# Patient Record
Sex: MALE | Race: WHITE | NOT HISPANIC OR LATINO | Employment: UNEMPLOYED | URBAN - METROPOLITAN AREA
[De-identification: names, ages, dates, MRNs, and addresses within clinical notes are randomized per-mention and may not be internally consistent; named-entity substitution may affect disease eponyms.]

---

## 2017-08-21 ENCOUNTER — ALLSCRIPTS OFFICE VISIT (OUTPATIENT)
Dept: OTHER | Facility: OTHER | Age: 52
End: 2017-08-21

## 2017-08-21 DIAGNOSIS — M62.08 SEPARATION OF MUSCLE (NONTRAUMATIC), OTHER SITE: ICD-10-CM

## 2017-08-22 ENCOUNTER — TRANSCRIBE ORDERS (OUTPATIENT)
Dept: ADMINISTRATIVE | Facility: HOSPITAL | Age: 52
End: 2017-08-22

## 2017-08-22 DIAGNOSIS — K43.9 OTHER VENTRAL HERNIA WITHOUT MENTION OF OBSTRUCTION OR GANGRENE: ICD-10-CM

## 2017-08-22 DIAGNOSIS — M62.08 DIASTASIS RECTI: Primary | ICD-10-CM

## 2017-08-24 ENCOUNTER — HOSPITAL ENCOUNTER (OUTPATIENT)
Dept: RADIOLOGY | Facility: HOSPITAL | Age: 52
Discharge: HOME | End: 2017-08-24
Payer: COMMERCIAL

## 2017-08-24 ENCOUNTER — HOSPITAL ENCOUNTER (OUTPATIENT)
Dept: RADIOLOGY | Facility: HOSPITAL | Age: 52
Discharge: HOME | End: 2017-08-24
Attending: SPECIALIST
Payer: COMMERCIAL

## 2017-08-24 LAB
BASOPHILS # BLD AUTO: 0.1 X10E3/UL (ref 0–0.2)
BASOPHILS # BLD AUTO: 1 %
DEPRECATED RDW RBC AUTO: 15 % (ref 12.3–15.4)
EOSINOPHIL # BLD AUTO: 0.2 X10E3/UL (ref 0–0.4)
EOSINOPHIL # BLD AUTO: 2 %
HCT VFR BLD AUTO: 53.1 % (ref 37.5–51)
HGB BLD-MCNC: 18.8 G/DL (ref 12.6–17.7)
IMM.GRANULOCYTES (CD4/8) (HISTORICAL): 0 %
IMM.GRANULOCYTES (CD4/8) (HISTORICAL): 0 X10E3/UL (ref 0–0.1)
LYMPHOCYTES # BLD AUTO: 4.6 X10E3/UL (ref 0.7–3.1)
LYMPHOCYTES # BLD AUTO: 41 %
MCH RBC QN AUTO: 35.5 PG (ref 26.6–33)
MCHC RBC AUTO-ENTMCNC: 35.4 G/DL (ref 31.5–35.7)
MCV RBC AUTO: 100 FL (ref 79–97)
MONOCYTES # BLD AUTO: 1 X10E3/UL (ref 0.1–0.9)
MONOCYTES (HISTORICAL): 9 %
NEUTROPHILS # BLD AUTO: 47 %
NEUTROPHILS # BLD AUTO: 5.4 X10E3/UL (ref 1.4–7)
PLATELET # BLD AUTO: 205 X10E3/UL (ref 150–379)
RBC (HISTORICAL): 5.29 X10E6/UL (ref 4.14–5.8)
WBC # BLD AUTO: 11.3 X10E3/UL (ref 3.4–10.8)

## 2017-08-24 PROCEDURE — 74177 CT ABD & PELVIS W/CONTRAST: CPT

## 2017-08-24 RX ADMIN — IOHEXOL 100 ML: 350 INJECTION, SOLUTION INTRAVENOUS at 11:42

## 2017-09-05 ENCOUNTER — ANESTHESIA EVENT (OUTPATIENT)
Dept: PERIOP | Facility: HOSPITAL | Age: 52
End: 2017-09-05

## 2017-09-06 ENCOUNTER — ANESTHESIA (OUTPATIENT)
Dept: PERIOP | Facility: HOSPITAL | Age: 52
End: 2017-09-06

## 2017-09-08 ENCOUNTER — APPOINTMENT (OUTPATIENT)
Dept: PREADMISSION TESTING | Facility: HOSPITAL | Age: 52
End: 2017-09-08
Payer: COMMERCIAL

## 2017-09-08 ENCOUNTER — TRANSCRIBE ORDERS (OUTPATIENT)
Dept: ADMINISTRATIVE | Facility: HOSPITAL | Age: 52
End: 2017-09-08

## 2017-09-08 ENCOUNTER — APPOINTMENT (OUTPATIENT)
Dept: LAB | Facility: HOSPITAL | Age: 52
End: 2017-09-08
Attending: SPECIALIST
Payer: COMMERCIAL

## 2017-09-08 VITALS — BODY MASS INDEX: 36.83 KG/M2 | WEIGHT: 243 LBS | HEIGHT: 68 IN

## 2017-09-08 DIAGNOSIS — Z01.818 PREOP EXAMINATION: Primary | ICD-10-CM

## 2017-09-08 PROCEDURE — 93005 ELECTROCARDIOGRAM TRACING: CPT

## 2017-09-08 RX ORDER — SUCRALFATE 1 G/1
1 TABLET ORAL 4 TIMES DAILY
Status: ON HOLD | COMMUNITY
End: 2017-10-31

## 2017-09-08 RX ORDER — ESCITALOPRAM OXALATE 10 MG/1
5 TABLET ORAL DAILY
COMMUNITY

## 2017-09-08 RX ORDER — ALBUTEROL SULFATE 90 UG/1
2 AEROSOL, METERED RESPIRATORY (INHALATION) EVERY 6 HOURS PRN
COMMUNITY

## 2017-09-08 RX ORDER — LISINOPRIL 20 MG/1
20 TABLET ORAL 2 TIMES DAILY
COMMUNITY

## 2017-09-08 RX ORDER — ALPRAZOLAM 1 MG/1
1 TABLET ORAL 4 TIMES DAILY
COMMUNITY

## 2017-09-08 RX ORDER — AMLODIPINE BESYLATE 5 MG/1
5 TABLET ORAL DAILY
COMMUNITY

## 2017-09-08 RX ORDER — QUETIAPINE FUMARATE 50 MG/1
50 TABLET, FILM COATED ORAL
COMMUNITY

## 2017-09-08 NOTE — PRE-PROCEDURE INSTRUCTIONS
My Surgical Experience    The following information was developed to assist you to prepare for your operation  What do I need to do before coming to the hospital?   Arrange for a responsible person to drive you to and from the hospital    Arrange care for your children at home  Children are not allowed in the recovery areas of the hospital   Plan to wear clothing that is easy to put on and take off  If you are having shoulder surgery, wear a shirt that buttons or zippers in the front  Bathing  o Shower the evening before and the morning of your surgery with an antibacterial soap  Please refer to the Pre Op Showering Instructions for Surgery Patients Sheet   o Remove nail polish and all body piercing jewelry  o Do not shave any body part for at least 24 hours before surgery-this includes face, arms, legs and upper body  Food  o Nothing to eat or drink after midnight the night before your surgery  This includes candy and chewing gum  o Exception: If your surgery is after 12:00pm (noon), you may have clear liquids such as 7-Up®, ginger ale, apple or cranberry juice, Jell-O®, water, or clear broth until 8:00 am  o Do not drink milk or juice with pulp on the morning before surgery  o Do not drink alcohol 24 hours before surgery  Medicine  o Follow instructions you received from your surgeon about which medicines you may take on the day of surgery  o If instructed to take medicine on the morning of surgery, take pills with just a small sip of water  Call your prescribing doctor for specific infroamtion on what to do if you take insulin    What should I bring to the hospital?    Bring:  Linda Knows or a walker, if you have them, for foot or knee surgery   A list of the daily medicines, vitamins, minerals, herbals and nutritional supplements you take   Include the dosages of medicines and the time you take them each day   Glasses, dentures or hearing aids   Minimal clothing; you will be wearing hospital sleepwear   Photo ID; required to verify your identity   If you have a Living Will or Power of , bring a copy of the documents   If you have an ostomy, bring an extra pouch and any supplies you use    Do not bring   Medicines or inhalers   Money, valuables or jewelry    What other information should I know about the day of surgery?  Notify your surgeons if you develop a cold, sore throat, cough, fever, rash or any other illness   Report to the Ambulatory Surgical/Same Day Surgery Unit   You will be instructed to stop at Registration only if you have not been pre-registered   Inform your  fi they do not stay that they will be asked by the staff to leave a phone number where they can be reached   Be available to be reached before surgery  In the event the operating room schedule changes, you may be asked to come in earlier or later than expected    *It is important to tell your doctor and others involved in your health care if you are taking or have been taking any non-prescription drugs, vitamins, minerals, herbals or other nutritional supplements  Any of these may interact with some food or medicines and cause a reaction      Pre-Surgery Instructions:   Medication Instructions    albuterol (PROVENTIL HFA,VENTOLIN HFA) 90 mcg/act inhaler Instructed patient per Anesthesia Guidelines   ALPRAZolam (XANAX) 1 mg tablet Instructed patient per Anesthesia Guidelines   amLODIPine (NORVASC) 5 mg tablet Instructed patient per Anesthesia Guidelines   escitalopram (LEXAPRO) 10 mg tablet Instructed patient per Anesthesia Guidelines   lisinopril (ZESTRIL) 20 mg tablet Instructed patient per Anesthesia Guidelines   QUEtiapine (SEROquel) 50 mg tablet Instructed patient per Anesthesia Guidelines   sucralfate (CARAFATE) 1 g tablet Instructed patient per Anesthesia Guidelines  To take lisinopril, amlodipine, lexapro and xanax a m   Of surgery

## 2017-09-11 LAB
ATRIAL RATE: 65 BPM
P AXIS: 48 DEGREES
PR INTERVAL: 154 MS
QRS AXIS: -23 DEGREES
QRSD INTERVAL: 94 MS
QT INTERVAL: 416 MS
QTC INTERVAL: 432 MS
T WAVE AXIS: 21 DEGREES
VENTRICULAR RATE: 65 BPM

## 2017-09-12 ENCOUNTER — GENERIC CONVERSION - ENCOUNTER (OUTPATIENT)
Dept: OTHER | Facility: OTHER | Age: 52
End: 2017-09-12

## 2017-09-27 ENCOUNTER — HOSPITAL ENCOUNTER (OUTPATIENT)
Facility: HOSPITAL | Age: 52
Setting detail: OUTPATIENT SURGERY
Discharge: HOME | End: 2017-09-27
Attending: SPECIALIST | Admitting: SPECIALIST
Payer: COMMERCIAL

## 2017-09-27 ENCOUNTER — GENERIC CONVERSION - ENCOUNTER (OUTPATIENT)
Dept: OTHER | Facility: OTHER | Age: 52
End: 2017-09-27

## 2017-09-27 VITALS
HEART RATE: 76 BPM | RESPIRATION RATE: 18 BRPM | OXYGEN SATURATION: 95 % | DIASTOLIC BLOOD PRESSURE: 56 MMHG | SYSTOLIC BLOOD PRESSURE: 94 MMHG | TEMPERATURE: 97.8 F

## 2017-09-27 DIAGNOSIS — K43.9 VENTRAL HERNIA WITHOUT OBSTRUCTION OR GANGRENE: ICD-10-CM

## 2017-09-27 PROCEDURE — 88302 TISSUE EXAM BY PATHOLOGIST: CPT | Performed by: SPECIALIST

## 2017-09-27 PROCEDURE — C1781 MESH (IMPLANTABLE): HCPCS | Performed by: SPECIALIST

## 2017-09-27 DEVICE — BARD VENTRALEX HERNIA PATCH, 4.3 CM (1.7"), SMALL CIRCLE WITH STRAP
Type: IMPLANTABLE DEVICE | Site: ABDOMEN | Status: FUNCTIONAL
Brand: VENTRALEX

## 2017-09-27 RX ORDER — HEPARIN SODIUM 5000 [USP'U]/ML
5000 INJECTION, SOLUTION INTRAVENOUS; SUBCUTANEOUS
Status: DISCONTINUED | OUTPATIENT
Start: 2017-09-27 | End: 2017-09-27 | Stop reason: HOSPADM

## 2017-09-27 RX ORDER — HYDROMORPHONE HCL 110MG/55ML
0.2 PATIENT CONTROLLED ANALGESIA SYRINGE INTRAVENOUS
Status: DISCONTINUED | OUTPATIENT
Start: 2017-09-27 | End: 2017-09-27 | Stop reason: HOSPADM

## 2017-09-27 RX ORDER — LEVOFLOXACIN 5 MG/ML
500 INJECTION, SOLUTION INTRAVENOUS ONCE
Status: COMPLETED | OUTPATIENT
Start: 2017-09-27 | End: 2017-09-27

## 2017-09-27 RX ORDER — ONDANSETRON 2 MG/ML
4 INJECTION INTRAMUSCULAR; INTRAVENOUS ONCE AS NEEDED
Status: DISCONTINUED | OUTPATIENT
Start: 2017-09-27 | End: 2017-09-27 | Stop reason: HOSPADM

## 2017-09-27 RX ORDER — LIDOCAINE HYDROCHLORIDE 10 MG/ML
INJECTION, SOLUTION INFILTRATION; PERINEURAL AS NEEDED
Status: DISCONTINUED | OUTPATIENT
Start: 2017-09-27 | End: 2017-09-27 | Stop reason: SURG

## 2017-09-27 RX ORDER — MAGNESIUM HYDROXIDE 1200 MG/15ML
LIQUID ORAL AS NEEDED
Status: DISCONTINUED | OUTPATIENT
Start: 2017-09-27 | End: 2017-09-27 | Stop reason: HOSPADM

## 2017-09-27 RX ORDER — SODIUM CHLORIDE, SODIUM LACTATE, POTASSIUM CHLORIDE, CALCIUM CHLORIDE 600; 310; 30; 20 MG/100ML; MG/100ML; MG/100ML; MG/100ML
INJECTION, SOLUTION INTRAVENOUS CONTINUOUS PRN
Status: DISCONTINUED | OUTPATIENT
Start: 2017-09-27 | End: 2017-09-27 | Stop reason: SURG

## 2017-09-27 RX ORDER — ROCURONIUM BROMIDE 10 MG/ML
INJECTION, SOLUTION INTRAVENOUS AS NEEDED
Status: DISCONTINUED | OUTPATIENT
Start: 2017-09-27 | End: 2017-09-27 | Stop reason: SURG

## 2017-09-27 RX ORDER — FENTANYL CITRATE 50 UG/ML
INJECTION, SOLUTION INTRAMUSCULAR; INTRAVENOUS AS NEEDED
Status: DISCONTINUED | OUTPATIENT
Start: 2017-09-27 | End: 2017-09-27 | Stop reason: SURG

## 2017-09-27 RX ORDER — IPRATROPIUM BROMIDE AND ALBUTEROL SULFATE 2.5; .5 MG/3ML; MG/3ML
3 SOLUTION RESPIRATORY (INHALATION)
Status: DISCONTINUED | OUTPATIENT
Start: 2017-09-27 | End: 2017-09-27 | Stop reason: HOSPADM

## 2017-09-27 RX ORDER — EPHEDRINE SULFATE 50 MG/ML
INJECTION, SOLUTION INTRAVENOUS AS NEEDED
Status: DISCONTINUED | OUTPATIENT
Start: 2017-09-27 | End: 2017-09-27 | Stop reason: SURG

## 2017-09-27 RX ORDER — PROPOFOL 10 MG/ML
INJECTION, EMULSION INTRAVENOUS AS NEEDED
Status: DISCONTINUED | OUTPATIENT
Start: 2017-09-27 | End: 2017-09-27 | Stop reason: SURG

## 2017-09-27 RX ORDER — DEXMEDETOMIDINE HYDROCHLORIDE 100 UG/ML
INJECTION, SOLUTION INTRAVENOUS AS NEEDED
Status: DISCONTINUED | OUTPATIENT
Start: 2017-09-27 | End: 2017-09-27 | Stop reason: SURG

## 2017-09-27 RX ORDER — FENTANYL CITRATE/PF 50 MCG/ML
25 SYRINGE (ML) INJECTION
Status: DISCONTINUED | OUTPATIENT
Start: 2017-09-27 | End: 2017-09-27 | Stop reason: HOSPADM

## 2017-09-27 RX ORDER — ONDANSETRON 2 MG/ML
INJECTION INTRAMUSCULAR; INTRAVENOUS AS NEEDED
Status: DISCONTINUED | OUTPATIENT
Start: 2017-09-27 | End: 2017-09-27 | Stop reason: SURG

## 2017-09-27 RX ORDER — SODIUM CHLORIDE, SODIUM LACTATE, POTASSIUM CHLORIDE, CALCIUM CHLORIDE 600; 310; 30; 20 MG/100ML; MG/100ML; MG/100ML; MG/100ML
75 INJECTION, SOLUTION INTRAVENOUS CONTINUOUS
Status: DISCONTINUED | OUTPATIENT
Start: 2017-09-27 | End: 2017-09-27 | Stop reason: HOSPADM

## 2017-09-27 RX ORDER — BUPIVACAINE HYDROCHLORIDE 5 MG/ML
INJECTION, SOLUTION PERINEURAL AS NEEDED
Status: DISCONTINUED | OUTPATIENT
Start: 2017-09-27 | End: 2017-09-27 | Stop reason: HOSPADM

## 2017-09-27 RX ORDER — GLYCOPYRROLATE 0.2 MG/ML
INJECTION INTRAMUSCULAR; INTRAVENOUS AS NEEDED
Status: DISCONTINUED | OUTPATIENT
Start: 2017-09-27 | End: 2017-09-27 | Stop reason: SURG

## 2017-09-27 RX ORDER — HYDROMORPHONE HYDROCHLORIDE 2 MG/ML
INJECTION, SOLUTION INTRAMUSCULAR; INTRAVENOUS; SUBCUTANEOUS AS NEEDED
Status: DISCONTINUED | OUTPATIENT
Start: 2017-09-27 | End: 2017-09-27 | Stop reason: SURG

## 2017-09-27 RX ADMIN — GLYCOPYRROLATE 0.4 MG: 0.2 INJECTION, SOLUTION INTRAMUSCULAR; INTRAVENOUS at 13:27

## 2017-09-27 RX ADMIN — FENTANYL CITRATE 100 MCG: 50 INJECTION, SOLUTION INTRAMUSCULAR; INTRAVENOUS at 12:41

## 2017-09-27 RX ADMIN — DEXMEDETOMIDINE 10 MCG: 100 INJECTION, SOLUTION, CONCENTRATE INTRAVENOUS at 13:16

## 2017-09-27 RX ADMIN — HEPARIN SODIUM 5000 UNITS: 5000 INJECTION, SOLUTION INTRAVENOUS; SUBCUTANEOUS at 12:12

## 2017-09-27 RX ADMIN — LEVOFLOXACIN 500 MG: 5 INJECTION, SOLUTION INTRAVENOUS at 12:20

## 2017-09-27 RX ADMIN — DEXMEDETOMIDINE 10 MCG: 100 INJECTION, SOLUTION, CONCENTRATE INTRAVENOUS at 13:17

## 2017-09-27 RX ADMIN — IPRATROPIUM BROMIDE AND ALBUTEROL SULFATE 3 ML: .5; 3 SOLUTION RESPIRATORY (INHALATION) at 13:53

## 2017-09-27 RX ADMIN — EPHEDRINE SULFATE 10 MG: 50 INJECTION, SOLUTION INTRAMUSCULAR; INTRAVENOUS; SUBCUTANEOUS at 13:06

## 2017-09-27 RX ADMIN — DEXMEDETOMIDINE 10 MCG: 100 INJECTION, SOLUTION, CONCENTRATE INTRAVENOUS at 13:20

## 2017-09-27 RX ADMIN — LIDOCAINE HYDROCHLORIDE 50 MG: 10 INJECTION, SOLUTION INFILTRATION; PERINEURAL at 12:41

## 2017-09-27 RX ADMIN — DEXMEDETOMIDINE 10 MCG: 100 INJECTION, SOLUTION, CONCENTRATE INTRAVENOUS at 13:27

## 2017-09-27 RX ADMIN — SODIUM CHLORIDE, SODIUM LACTATE, POTASSIUM CHLORIDE, AND CALCIUM CHLORIDE: .6; .31; .03; .02 INJECTION, SOLUTION INTRAVENOUS at 12:31

## 2017-09-27 RX ADMIN — DEXAMETHASONE SODIUM PHOSPHATE 4 MG: 10 INJECTION INTRAMUSCULAR; INTRAVENOUS at 13:09

## 2017-09-27 RX ADMIN — PROPOFOL 150 MG: 10 INJECTION, EMULSION INTRAVENOUS at 12:41

## 2017-09-27 RX ADMIN — FENTANYL CITRATE 25 MCG: 50 INJECTION INTRAMUSCULAR; INTRAVENOUS at 14:46

## 2017-09-27 RX ADMIN — EPHEDRINE SULFATE 10 MG: 50 INJECTION, SOLUTION INTRAMUSCULAR; INTRAVENOUS; SUBCUTANEOUS at 12:55

## 2017-09-27 RX ADMIN — ROCURONIUM BROMIDE 40 MG: 10 INJECTION, SOLUTION INTRAVENOUS at 12:41

## 2017-09-27 RX ADMIN — HYDROMORPHONE HYDROCHLORIDE 0.8 MG: 2 INJECTION, SOLUTION INTRAMUSCULAR; INTRAVENOUS; SUBCUTANEOUS at 13:32

## 2017-09-27 RX ADMIN — ONDANSETRON 4 MG: 2 INJECTION INTRAMUSCULAR; INTRAVENOUS at 13:09

## 2017-09-27 RX ADMIN — NEOSTIGMINE METHYLSULFATE 2 MG: 1 INJECTION, SOLUTION INTRAMUSCULAR; INTRAVENOUS; SUBCUTANEOUS at 13:27

## 2017-09-27 NOTE — OP NOTE
OPERATIVE REPORT  PATIENT NAME: Christina Floyd    :  1965  MRN: [id number]  Pt Location: [facility] OR ROOM 01    SURGERY DATE: 2017    Surgeon(s) and Role:     * Meet Calvert MD - Primary    Preop Diagnosis:  Ventral hernia without obstruction or gangrene [K43 9]    Post-Op Diagnosis Codes: * Ventral hernia without obstruction or gangrene Procedure(s) (LRB):  REPAIR HERNIA VENTRAL WITH MESH (N/A) partial omentectomy    Specimen(s):  ID Type Source Tests Collected by Time Destination   1 : sac and omentum Tissue Soft Tissue, Other TISSUE EXAM Meet Calvert MD 2017 1304        Estimated Blood Loss:   Minimal    Drains:       Anesthesia Type:   General    Operative Indications:  Ventral hernia without obstruction or gangrene       Operative Findings:  Ventral hernia with omentum in sac    Complications:   None    Procedure and Technique:  The patient was taken to the operating room at [facility] on the stay were time-out was called identifying Christina Floyd for repair ventral hernia  He has was marked in the holding area  The time-out included the patient's name, date of birth, procedure to be done, allergies to penicillin Percocet and Vicodin, Levaquin given, heparin given, anesthesia risk of three and fire risk of 2-3  The prep was done and dry  Once adequate general anesthesia was obtained the area was prepped and draped in the normal fashion  Incision was made and carried through the skin and subcutaneous tissue  The hernia was dissected sac was opened  Omentum was in the sac which was resected  A partial omentectomy was performed along with removal of the sac  The defect was a little over 1 cm  Was elected to repair this with a Ventralex hernia SYSTEM SMALL  LOT HU BQ 2242   REFERENCE [id number]  USE BY 10/28/2028  The mesh straps were deployed  Mattress sutures of 0 Prolene were used    This combined the inner envelope of the mesh, thrown through the abdominal wall, through the strap, and back through the strap, thrown through the abdominal wall and through the inner envelope of the mesh where this was tied  Sutures were placed at the 12, one, six, and seven o'clock positions  Senz retractors were inserted to ensure that the mesh lay flat which was accomplished  Sutures were cut  Excess straps were cut  The area was irrigated with saline solution  The sac and omentum were sent to pathology for evaluation  The subcutaneous tissue was closed with interrupted sutures of 2-0 chromic and 3-0 plain  The skin was closed with a subcuticular stitch of 4-0 PDS  0 5% Sensorcaine was infiltrated  Steri-Strips applied  Sterile dressings were applied  Sponge and count were correct    Patient tolerated procedure well and returned to PACU  I was present for the entire procedure    Patient Disposition:  PACU     SIGNATURE: Kimberly Freeman MD  DATE: September 27, 2017  TIME: 1:22 PM

## 2017-09-27 NOTE — DISCHARGE INSTRUCTIONS
Dr Manny Jimenez  Discharge Instructions      · No driving or operating machinery for 24 hours or while taking pain medication  No driving until seen by Dr Manny Jimenez following any abdominal surgery  · No alcoholic beverages for 24 hours or while taking pain medication  · DO NOT make any important personal or business decisions for 24 hours  Diet:  · Begin with liquids and light food and progress to your normal diet unless you are nauseated or otherwise instructed by your physician  Medication:  · Begin taking pain medication as directed and remember that narcotic medications may be constipating  Consider starting over the counter stool softeners  If constipation persists begin taking over the counter laxative  Dressing:  · Remove dressing in 2 days and may shower  · After first shower you may leave incision open to air or reapply a small dressing or Band-Aid       · May apply ice to incision unless otherwise directed      Activity  · No sex, strenuous exercise, or heavy lifting after any abdominal surgery    Make an appointment to see Dr Manny Jimenez in 7 days

## 2017-09-27 NOTE — PERIOPERATIVE NURSING NOTE
Received pt to [facility] from PACU, alert, VSS, mild 2/10 pain noted  Tolerating po fluids  IV infusing well  No urge to void at this time  Abdominal binder in place

## 2017-10-16 ENCOUNTER — ALLSCRIPTS OFFICE VISIT (OUTPATIENT)
Dept: OTHER | Facility: OTHER | Age: 52
End: 2017-10-16

## 2017-10-25 RX ORDER — ACETAMINOPHEN 325 MG/1
650 TABLET ORAL EVERY 6 HOURS PRN
COMMUNITY

## 2017-10-25 NOTE — PRE-PROCEDURE INSTRUCTIONS
My Surgical Experience    The following information was developed to assist you to prepare for your operation  What do I need to do before coming to the hospital?   Arrange for a responsible person to drive you to and from the hospital    Arrange care for your children at home  Children are not allowed in the recovery areas of the hospital   Plan to wear clothing that is easy to put on and take off  If you are having shoulder surgery, wear a shirt that buttons or zippers in the front  Bathing  o Shower the evening before and the morning of your surgery with an antibacterial soap  Please refer to the Pre Op Showering Instructions for Surgery Patients Sheet   o Remove nail polish and all body piercing jewelry  o Do not shave any body part for at least 24 hours before surgery-this includes face, arms, legs and upper body  Food  o Nothing to eat or drink after midnight the night before your surgery  This includes candy and chewing gum  o Exception: If your surgery is after 12:00pm (noon), you may have clear liquids such as 7-Up®, ginger ale, apple or cranberry juice, Jell-O®, water, or clear broth until 8:00 am  o Do not drink milk or juice with pulp on the morning before surgery  o Do not drink alcohol 24 hours before surgery  Medicine  o Follow instructions you received from your surgeon about which medicines you may take on the day of surgery  o If instructed to take medicine on the morning of surgery, take pills with just a small sip of water  Call your prescribing doctor for specific infroamtion on what to do if you take insulin    What should I bring to the hospital?    Bring:  Precious Fitzgerald or a walker, if you have them, for foot or knee surgery   A list of the daily medicines, vitamins, minerals, herbals and nutritional supplements you take   Include the dosages of medicines and the time you take them each day   Glasses, dentures or hearing aids   Minimal clothing; you will be wearing hospital sleepwear   Photo ID; required to verify your identity   If you have a Living Will or Power of , bring a copy of the documents   If you have an ostomy, bring an extra pouch and any supplies you use    Do not bring   Medicines or inhalers   Money, valuables or jewelry    What other information should I know about the day of surgery?  Notify your surgeons if you develop a cold, sore throat, cough, fever, rash or any other illness   Report to the Ambulatory Surgical/Same Day Surgery Unit   You will be instructed to stop at Registration only if you have not been pre-registered   Inform your  fi they do not stay that they will be asked by the staff to leave a phone number where they can be reached   Be available to be reached before surgery  In the event the operating room schedule changes, you may be asked to come in earlier or later than expected    *It is important to tell your doctor and others involved in your health care if you are taking or have been taking any non-prescription drugs, vitamins, minerals, herbals or other nutritional supplements  Any of these may interact with some food or medicines and cause a reaction      Pre-Surgery Instructions:   Medication Instructions    acetaminophen (TYLENOL) 325 mg tablet Instructed patient per Anesthesia Guidelines   albuterol (PROVENTIL HFA,VENTOLIN HFA) 90 mcg/act inhaler Instructed patient per Anesthesia Guidelines   ALPRAZolam (XANAX) 1 mg tablet Instructed patient per Anesthesia Guidelines   amLODIPine (NORVASC) 5 mg tablet Instructed patient per Anesthesia Guidelines   escitalopram (LEXAPRO) 10 mg tablet Instructed patient per Anesthesia Guidelines   lisinopril (ZESTRIL) 20 mg tablet Instructed patient per Anesthesia Guidelines   QUEtiapine (SEROquel) 50 mg tablet Instructed patient per Anesthesia Guidelines   sucralfate (CARAFATE) 1 g tablet Instructed patient per Anesthesia Guidelines      To take xanax, amlodipine, lexapro , lisinopril, and inhaler a m   Of surgery

## 2017-10-30 ENCOUNTER — ANESTHESIA EVENT (OUTPATIENT)
Dept: PERIOP | Facility: HOSPITAL | Age: 52
End: 2017-10-30
Payer: COMMERCIAL

## 2017-10-31 ENCOUNTER — ANESTHESIA (OUTPATIENT)
Dept: PERIOP | Facility: HOSPITAL | Age: 52
End: 2017-10-31
Payer: COMMERCIAL

## 2017-10-31 ENCOUNTER — HOSPITAL ENCOUNTER (OUTPATIENT)
Facility: HOSPITAL | Age: 52
Setting detail: OUTPATIENT SURGERY
Discharge: HOME | End: 2017-10-31
Attending: SPECIALIST | Admitting: SPECIALIST
Payer: COMMERCIAL

## 2017-10-31 VITALS
SYSTOLIC BLOOD PRESSURE: 120 MMHG | OXYGEN SATURATION: 94 % | TEMPERATURE: 97.5 F | HEART RATE: 86 BPM | BODY MASS INDEX: 36.64 KG/M2 | WEIGHT: 241 LBS | RESPIRATION RATE: 18 BRPM | DIASTOLIC BLOOD PRESSURE: 57 MMHG

## 2017-10-31 LAB
BASOPHILS # BLD AUTO: 0 X10E3/UL (ref 0–0.2)
BASOPHILS # BLD AUTO: 1 %
DEPRECATED RDW RBC AUTO: 14 % (ref 12.3–15.4)
EOSINOPHIL # BLD AUTO: 0.2 X10E3/UL (ref 0–0.4)
EOSINOPHIL # BLD AUTO: 3 %
HCT VFR BLD AUTO: 52.1 % (ref 37.5–51)
HEMATOLOGY COMMENT (HISTORICAL): ABNORMAL
HGB BLD-MCNC: 18.3 G/DL (ref 12.6–17.7)
IMM.GRANULOCYTES (CD4/8) (HISTORICAL): 0 %
IMM.GRANULOCYTES (CD4/8) (HISTORICAL): 0 X10E3/UL (ref 0–0.1)
LYMPHOCYTES # BLD AUTO: 2.6 X10E3/UL (ref 0.7–3.1)
LYMPHOCYTES # BLD AUTO: 32 %
MCH RBC QN AUTO: 34 PG (ref 26.6–33)
MCHC RBC AUTO-ENTMCNC: 35.1 G/DL (ref 31.5–35.7)
MCV RBC AUTO: 97 FL (ref 79–97)
MONOCYTES # BLD AUTO: 0.8 X10E3/UL (ref 0.1–0.9)
MONOCYTES (HISTORICAL): 10 %
NEUTROPHILS # BLD AUTO: 4.3 X10E3/UL (ref 1.4–7)
NEUTROPHILS # BLD AUTO: 54 %
PLATELET # BLD AUTO: 175 X10E3/UL (ref 150–379)
RBC (HISTORICAL): 5.39 X10E6/UL (ref 4.14–5.8)
WBC # BLD AUTO: 8 X10E3/UL (ref 3.4–10.8)

## 2017-10-31 PROCEDURE — C1781 MESH (IMPLANTABLE): HCPCS | Performed by: SPECIALIST

## 2017-10-31 DEVICE — MESH PROLENE LARGE CIRCLE 4 IN: Type: IMPLANTABLE DEVICE | Site: INGUINAL | Status: FUNCTIONAL

## 2017-10-31 RX ORDER — FENTANYL CITRATE/PF 50 MCG/ML
25 SYRINGE (ML) INJECTION
Status: DISCONTINUED | OUTPATIENT
Start: 2017-10-31 | End: 2017-10-31 | Stop reason: HOSPADM

## 2017-10-31 RX ORDER — SODIUM CHLORIDE, SODIUM LACTATE, POTASSIUM CHLORIDE, CALCIUM CHLORIDE 600; 310; 30; 20 MG/100ML; MG/100ML; MG/100ML; MG/100ML
INJECTION, SOLUTION INTRAVENOUS CONTINUOUS PRN
Status: DISCONTINUED | OUTPATIENT
Start: 2017-10-31 | End: 2017-10-31 | Stop reason: SURG

## 2017-10-31 RX ORDER — TRAMADOL HYDROCHLORIDE 50 MG/1
50 TABLET ORAL EVERY 6 HOURS PRN
Qty: 40 TABLET | Refills: 0 | Status: SHIPPED | OUTPATIENT
Start: 2017-10-31 | End: 2017-11-10

## 2017-10-31 RX ORDER — EPHEDRINE SULFATE 50 MG/ML
INJECTION, SOLUTION INTRAVENOUS AS NEEDED
Status: DISCONTINUED | OUTPATIENT
Start: 2017-10-31 | End: 2017-10-31 | Stop reason: SURG

## 2017-10-31 RX ORDER — ONDANSETRON 2 MG/ML
4 INJECTION INTRAMUSCULAR; INTRAVENOUS ONCE AS NEEDED
Status: DISCONTINUED | OUTPATIENT
Start: 2017-10-31 | End: 2017-10-31 | Stop reason: HOSPADM

## 2017-10-31 RX ORDER — MIDAZOLAM HYDROCHLORIDE 1 MG/ML
INJECTION INTRAMUSCULAR; INTRAVENOUS AS NEEDED
Status: DISCONTINUED | OUTPATIENT
Start: 2017-10-31 | End: 2017-10-31 | Stop reason: SURG

## 2017-10-31 RX ORDER — HEPARIN SODIUM 5000 [USP'U]/ML
5000 INJECTION, SOLUTION INTRAVENOUS; SUBCUTANEOUS
Status: DISCONTINUED | OUTPATIENT
Start: 2017-10-31 | End: 2017-10-31 | Stop reason: HOSPADM

## 2017-10-31 RX ORDER — BUPIVACAINE HYDROCHLORIDE 5 MG/ML
INJECTION, SOLUTION PERINEURAL AS NEEDED
Status: DISCONTINUED | OUTPATIENT
Start: 2017-10-31 | End: 2017-10-31 | Stop reason: HOSPADM

## 2017-10-31 RX ORDER — ONDANSETRON 2 MG/ML
INJECTION INTRAMUSCULAR; INTRAVENOUS AS NEEDED
Status: DISCONTINUED | OUTPATIENT
Start: 2017-10-31 | End: 2017-10-31 | Stop reason: SURG

## 2017-10-31 RX ORDER — LIDOCAINE HYDROCHLORIDE 10 MG/ML
INJECTION, SOLUTION INFILTRATION; PERINEURAL AS NEEDED
Status: DISCONTINUED | OUTPATIENT
Start: 2017-10-31 | End: 2017-10-31 | Stop reason: SURG

## 2017-10-31 RX ORDER — PROPOFOL 10 MG/ML
INJECTION, EMULSION INTRAVENOUS AS NEEDED
Status: DISCONTINUED | OUTPATIENT
Start: 2017-10-31 | End: 2017-10-31 | Stop reason: SURG

## 2017-10-31 RX ORDER — FENTANYL CITRATE 50 UG/ML
INJECTION, SOLUTION INTRAMUSCULAR; INTRAVENOUS AS NEEDED
Status: DISCONTINUED | OUTPATIENT
Start: 2017-10-31 | End: 2017-10-31 | Stop reason: SURG

## 2017-10-31 RX ORDER — LEVOFLOXACIN 5 MG/ML
500 INJECTION, SOLUTION INTRAVENOUS ONCE
Status: COMPLETED | OUTPATIENT
Start: 2017-10-31 | End: 2017-10-31

## 2017-10-31 RX ADMIN — EPHEDRINE SULFATE 10 MG: 50 INJECTION, SOLUTION INTRAMUSCULAR; INTRAVENOUS; SUBCUTANEOUS at 09:53

## 2017-10-31 RX ADMIN — FENTANYL CITRATE 25 MCG: 50 INJECTION, SOLUTION INTRAMUSCULAR; INTRAVENOUS at 09:40

## 2017-10-31 RX ADMIN — EPHEDRINE SULFATE 10 MG: 50 INJECTION, SOLUTION INTRAMUSCULAR; INTRAVENOUS; SUBCUTANEOUS at 10:08

## 2017-10-31 RX ADMIN — MIDAZOLAM HYDROCHLORIDE 2 MG: 1 INJECTION, SOLUTION INTRAMUSCULAR; INTRAVENOUS at 09:35

## 2017-10-31 RX ADMIN — LIDOCAINE HYDROCHLORIDE 50 MG: 10 INJECTION, SOLUTION INFILTRATION; PERINEURAL at 09:38

## 2017-10-31 RX ADMIN — FENTANYL CITRATE 25 MCG: 50 INJECTION, SOLUTION INTRAMUSCULAR; INTRAVENOUS at 10:01

## 2017-10-31 RX ADMIN — EPHEDRINE SULFATE 5 MG: 50 INJECTION, SOLUTION INTRAMUSCULAR; INTRAVENOUS; SUBCUTANEOUS at 10:14

## 2017-10-31 RX ADMIN — FENTANYL CITRATE 25 MCG: 50 INJECTION INTRAMUSCULAR; INTRAVENOUS at 10:57

## 2017-10-31 RX ADMIN — EPHEDRINE SULFATE 5 MG: 50 INJECTION, SOLUTION INTRAMUSCULAR; INTRAVENOUS; SUBCUTANEOUS at 10:22

## 2017-10-31 RX ADMIN — SODIUM CHLORIDE, SODIUM LACTATE, POTASSIUM CHLORIDE, AND CALCIUM CHLORIDE: .6; .31; .03; .02 INJECTION, SOLUTION INTRAVENOUS at 08:50

## 2017-10-31 RX ADMIN — DEXAMETHASONE SODIUM PHOSPHATE 8 MG: 10 INJECTION INTRAMUSCULAR; INTRAVENOUS at 10:00

## 2017-10-31 RX ADMIN — PROPOFOL 200 MG: 10 INJECTION, EMULSION INTRAVENOUS at 09:38

## 2017-10-31 RX ADMIN — FENTANYL CITRATE 25 MCG: 50 INJECTION, SOLUTION INTRAMUSCULAR; INTRAVENOUS at 10:05

## 2017-10-31 RX ADMIN — PROPOFOL 100 MG: 10 INJECTION, EMULSION INTRAVENOUS at 09:41

## 2017-10-31 RX ADMIN — FENTANYL CITRATE 25 MCG: 50 INJECTION, SOLUTION INTRAMUSCULAR; INTRAVENOUS at 09:38

## 2017-10-31 RX ADMIN — HEPARIN SODIUM 5000 UNITS: 5000 INJECTION, SOLUTION INTRAVENOUS; SUBCUTANEOUS at 09:32

## 2017-10-31 RX ADMIN — LEVOFLOXACIN 500 MG: 5 INJECTION, SOLUTION INTRAVENOUS at 08:50

## 2017-10-31 RX ADMIN — ONDANSETRON 4 MG: 2 INJECTION INTRAMUSCULAR; INTRAVENOUS at 10:00

## 2017-10-31 RX ADMIN — SODIUM CHLORIDE, SODIUM LACTATE, POTASSIUM CHLORIDE, AND CALCIUM CHLORIDE: .6; .31; .03; .02 INJECTION, SOLUTION INTRAVENOUS at 10:18

## 2017-10-31 NOTE — OP NOTE
OPERATIVE REPORT  PATIENT NAME: Connor Guevara    :  1965  MRN: [id number]  Pt Location: [facility] OR ROOM 02    SURGERY DATE: 10/31/2017    Surgeon(s) and Role:     * Jarrod George MD - Primary    Preop Diagnosis:  Unilateral inguinal hernia without obstruction or gangrene [K40 90]    Post-Op Diagnosis Codes:     * Unilateral inguinal hernia without obstruction or gangrene [K40 90]    Procedure(s) (LRB):  INGUINAL HERNIA REPAIR WITH MESH (Left)    Specimen(s):  * No specimens in log *    Estimated Blood Loss:   Minimal    Drains:       Anesthesia Type:   General    Operative Indications:  Unilateral inguinal hernia without obstruction or gangrene [K40 90]      Operative Findings:  Large left direct inguinal hernia    Complications:   None    Procedure and Technique:  Patient was taken to the operating room assay were time-out was called identifying Connor Guevara  for repair of left inguinal hernia  A yes was marked in the holding area  The time-out included the patient's name, date of birth, procedure to be done, site of procedure, area marked, prepped and a dry, anesthesia risk of three, allergies to penicillin Percocet and Vicodin, no beta-blockers, Levaquin given, and heparin given  This was agreed to by the anesthesiologist, scrub and circulating nurses, RNFA and myself  The anesthesia risk was three and the fire risk was 2-3  Once adequate general anesthesia was obtained the areas prepped and draped in normal fashion  Incision was made carried through the skin subcutaneous tissue and Darya's fascia  Bleeders were clamped and tied with ties of two 0 chromic and coagulated  The external oblique aponeurosis was opened in the course of its fibers extending through the external ring after 0 5% Sensorcaine was infiltrated  Cord structures and hernia contents were dissected in the region of the pubic tubercle and surrounded with use of a Penrose drain  No evidence of indirect hernia was found or lipoma    A large direct hernia was found occupying the entire floor  The transversalis fascia was opened and the hernia was reduced  A Ray-Viviane sponge was placed to create space for the Prolene hernia system  The large system was used  [facility]  [id number]  USE BY 12/31/2021 THE UNDERLAY OF THE MESH WAS DEPLOYED  THE TRANSVERSALIS FASCIA WAS REAPPROXIMATED WITH A CONTINUOUS SUTURE OF 0 CHROMIC  THE OVERLAY OF THE MESH WAS NEXT APPLIED  SUTURES OF TWO 0 VICRYL WERE USED  THE 1ST combined the mesh to the pubic tubercle  Interrupted sutures combine the mesh to the conjoined tendon  A slit was made at the mesh at the cord structures to recreate the internal ring by suturing the mesh to the shelving edge of Poupart's ligament back to the mesh at this time  I hemostat was inserted to make sure there was enough space at the internal ring to ensure viability the cord structures which was accomplished  Final sutures combined the mesh to the shelving edge of Poupart's ligament half-way between the pubic tubercle and internal ring and superior to the internal ring  Cord structures and nerves were placed in the normal position  The external oblique aponeurosis was closed with a continuous suture of 2-0 PDS  Betadine was applied to the subcutaneous tissue and irrigated with saline solution  Darya's fashion subcutaneous tissue was closed with interrupted sutures of 3-0 plain in layers  The skin was closed with a subcuticular stitch of four 0 PDS  A nerve block of 0 5% Sensorcaine was given one fingerbreadth medial to the anterior superior iliac spine followed next by the incision  Steri-Strips were applied  Sterile dressings were applied  Sponge and instrument count were correct  Patient tolerated procedure well returned to PACU    I was present for the entire procedure    Patient Disposition:  PACU     SIGNATURE: Nadir Knight MD  DATE: October 31, 2017  TIME: 10:30 AM

## 2017-10-31 NOTE — ANESTHESIA PREPROCEDURE EVALUATION
Review of Systems/Medical History  Patient summary reviewed  Chart reviewed  No history of anesthetic complications     Cardiovascular  Hyperlipidemia, Hypertension ,    Pulmonary  Smoker cigarette smoker 5-10 packs per year , Tobacco cessation counseling given, COPD moderate- medication dependent , ,   Comment: H/o tracheostomy     GI/Hepatic            Endo/Other     GYN       Hematology   Musculoskeletal  Obesity  morbid obesity,        Neurology   Psychology   Anxiety,            Physical Exam    Airway    Mallampati score: II  TM Distance: >3 FB  Neck ROM: full     Dental   No notable dental hx upper dentures,     Cardiovascular  Cardiovascular exam normal    Pulmonary  Pulmonary exam normal     Other Findings        Anesthesia Plan  ASA Score- 3       Anesthesia Type- general with ASA Monitors  Additional Monitors:   Airway Plan: ETT  Induction- intravenous  Informed Consent- Anesthetic plan and risks discussed with patient  I personally reviewed this patient with the CRNA  Discussed and agreed on the Anesthesia Plan with the CRNA  Puja De Los Santos

## 2017-10-31 NOTE — ANESTHESIA POSTPROCEDURE EVALUATION
Post-Op Assessment Note      CV Status:  Stable    Mental Status:  Alert and awake    Hydration Status:  Euvolemic    PONV Controlled:  Controlled    Airway Patency:  Patent    Post Op Vitals Reviewed: Yes          Staff: Anesthesiologist, CRNA           BP      Temp     Pulse     Resp      SpO2

## 2017-10-31 NOTE — DISCHARGE INSTRUCTIONS
SAME DAY SURGERY POST-OPERATIVE INSTRUCTIONS    Jose Alejandro Farnsworth    Please follow carefully all instructions checked below  1  Diet:  · Begin with liquids and light food (Jell-O, soup, etc) Progress slowly to your normal diet  No alcoholic beverages for 24 hours  · Resume your normal diet  2  Medications:  · Home medications reviewed  Resume pre-operative medications unless noted below  · {Blank single:86363::"Prescription sent home with patient","Prescription sent over to pharmacy"}  See after visit summary for reconciled discharge medications provided to patient and family  3  Activities:  · Do NOT make important personal or business decisions for 24 hours  · Do NOT drive or operate machinery for 24 hours  · While taking pain medication, do not drive and be careful as you walk or climb stairs  · Limit your activities for 24 hours  Do not engage in sports, heavy work or heavy  lifting until your physician gives you permission  4  Wound Care:  · Change dressings as necessary after 2 days  · Keep dressings dry  · No sex, douching, tampons  5  Special Instructions:  · Elevate operative site for the next 12-24 hours  · Apply ice to operative site as directed  · Full weight bearing  6  Bathing:  · May shower after 2 days  7  Follow-up Care:  · Make an appointment to see your doctor in 7 days        Hedy Oliva MD  10/31/17  10:37 AM

## 2017-11-06 ENCOUNTER — ANESTHESIA EVENT (OUTPATIENT)
Dept: PERIOP | Facility: HOSPITAL | Age: 52
End: 2017-11-06
Payer: COMMERCIAL

## 2017-11-06 NOTE — PRE-PROCEDURE INSTRUCTIONS
My Surgical Experience    The following information was developed to assist you to prepare for your operation  What do I need to do before coming to the hospital?   Arrange for a responsible person to drive you to and from the hospital    Arrange care for your children at home  Children are not allowed in the recovery areas of the hospital   Plan to wear clothing that is easy to put on and take off  If you are having shoulder surgery, wear a shirt that buttons or zippers in the front  Bathing  o Shower the evening before and the morning of your surgery with an antibacterial soap  Please refer to the Pre Op Showering Instructions for Surgery Patients Sheet   o Remove nail polish and all body piercing jewelry  o Do not shave any body part for at least 24 hours before surgery-this includes face, arms, legs and upper body  Food  o Nothing to eat or drink after midnight the night before your surgery  This includes candy and chewing gum  o Exception: If your surgery is after 12:00pm (noon), you may have clear liquids such as 7-Up®, ginger ale, apple or cranberry juice, Jell-O®, water, or clear broth until 8:00 am  o Do not drink milk or juice with pulp on the morning before surgery  o Do not drink alcohol 24 hours before surgery  Medicine  o Follow instructions you received from your surgeon about which medicines you may take on the day of surgery  o If instructed to take medicine on the morning of surgery, take pills with just a small sip of water  Call your prescribing doctor for specific infroamtion on what to do if you take insulin    What should I bring to the hospital?    Bring:  Linda Knows or a walker, if you have them, for foot or knee surgery   A list of the daily medicines, vitamins, minerals, herbals and nutritional supplements you take   Include the dosages of medicines and the time you take them each day   Glasses, dentures or hearing aids   Minimal clothing; you will be wearing hospital sleepwear   Photo ID; required to verify your identity   If you have a Living Will or Power of , bring a copy of the documents   If you have an ostomy, bring an extra pouch and any supplies you use    Do not bring   Medicines or inhalers   Money, valuables or jewelry    What other information should I know about the day of surgery?  Notify your surgeons if you develop a cold, sore throat, cough, fever, rash or any other illness   Report to the Ambulatory Surgical/Same Day Surgery Unit   You will be instructed to stop at Registration only if you have not been pre-registered   Inform your  fi they do not stay that they will be asked by the staff to leave a phone number where they can be reached   Be available to be reached before surgery  In the event the operating room schedule changes, you may be asked to come in earlier or later than expected    *It is important to tell your doctor and others involved in your health care if you are taking or have been taking any non-prescription drugs, vitamins, minerals, herbals or other nutritional supplements  Any of these may interact with some food or medicines and cause a reaction      Pre-Surgery Instructions:   Medication Instructions    acetaminophen (TYLENOL) 325 mg tablet Instructed patient per Anesthesia Guidelines   albuterol (PROVENTIL HFA,VENTOLIN HFA) 90 mcg/act inhaler Instructed patient per Anesthesia Guidelines   ALPRAZolam (XANAX) 1 mg tablet Instructed patient per Anesthesia Guidelines   amLODIPine (NORVASC) 5 mg tablet Instructed patient per Anesthesia Guidelines   escitalopram (LEXAPRO) 10 mg tablet Instructed patient per Anesthesia Guidelines   lisinopril (ZESTRIL) 20 mg tablet Instructed patient per Anesthesia Guidelines   QUEtiapine (SEROquel) 50 mg tablet Instructed patient per Anesthesia Guidelines  To take lisinopril, xanax, amlodipine, and escitalopram a m   Of surgery

## 2017-11-06 NOTE — ANESTHESIA PREPROCEDURE EVALUATION
Hypertension    Anxiety    COPD (chronic obstructive pulmonary disease) (HCC)    Obesity    Hyperlipidemia        Review of Systems/Medical History  Patient summary reviewed  Chart reviewed      Cardiovascular  Hyperlipidemia, Hypertension ,    Pulmonary  Smoker cigarette smoker , COPD , ,        GI/Hepatic            Endo/Other     GYN       Hematology   Musculoskeletal  Obesity  morbid obesity,        Neurology   Psychology   Anxiety,            Physical Exam    Airway    Mallampati score: II  TM Distance: >3 FB  Neck ROM: full     Dental       Cardiovascular  Rhythm: regular, Rate: normal,     Pulmonary  Breath sounds clear to auscultation,     Other Findings        Anesthesia Plan  ASA Score- 3       Anesthesia Type- general with ASA Monitors  Additional Monitors:   Airway Plan:           Induction- intravenous  Informed Consent- Anesthetic plan and risks discussed with patient

## 2017-11-07 ENCOUNTER — ANESTHESIA (OUTPATIENT)
Dept: PERIOP | Facility: HOSPITAL | Age: 52
End: 2017-11-07
Payer: COMMERCIAL

## 2017-11-07 ENCOUNTER — HOSPITAL ENCOUNTER (OUTPATIENT)
Facility: HOSPITAL | Age: 52
Setting detail: OUTPATIENT SURGERY
Discharge: HOME | End: 2017-11-07
Attending: SPECIALIST | Admitting: SPECIALIST
Payer: COMMERCIAL

## 2017-11-07 VITALS
OXYGEN SATURATION: 95 % | HEART RATE: 83 BPM | TEMPERATURE: 97.6 F | SYSTOLIC BLOOD PRESSURE: 146 MMHG | RESPIRATION RATE: 20 BRPM | DIASTOLIC BLOOD PRESSURE: 88 MMHG

## 2017-11-07 DIAGNOSIS — K40.90 UNILATERAL INGUINAL HERNIA WITHOUT OBSTRUCTION OR GANGRENE: ICD-10-CM

## 2017-11-07 PROCEDURE — C1781 MESH (IMPLANTABLE): HCPCS | Performed by: SPECIALIST

## 2017-11-07 PROCEDURE — 88302 TISSUE EXAM BY PATHOLOGIST: CPT | Performed by: SPECIALIST

## 2017-11-07 DEVICE — MESH PROLENE LARGE CIRCLE 4 IN: Type: IMPLANTABLE DEVICE | Site: GROIN | Status: FUNCTIONAL

## 2017-11-07 RX ORDER — BUPIVACAINE HYDROCHLORIDE 5 MG/ML
INJECTION, SOLUTION PERINEURAL AS NEEDED
Status: DISCONTINUED | OUTPATIENT
Start: 2017-11-07 | End: 2017-11-07 | Stop reason: HOSPADM

## 2017-11-07 RX ORDER — LEVOFLOXACIN 5 MG/ML
500 INJECTION, SOLUTION INTRAVENOUS ONCE
Status: COMPLETED | OUTPATIENT
Start: 2017-11-07 | End: 2017-11-07

## 2017-11-07 RX ORDER — METOCLOPRAMIDE HYDROCHLORIDE 5 MG/ML
INJECTION INTRAMUSCULAR; INTRAVENOUS AS NEEDED
Status: DISCONTINUED | OUTPATIENT
Start: 2017-11-07 | End: 2017-11-07 | Stop reason: SURG

## 2017-11-07 RX ORDER — GLYCOPYRROLATE 0.2 MG/ML
INJECTION INTRAMUSCULAR; INTRAVENOUS AS NEEDED
Status: DISCONTINUED | OUTPATIENT
Start: 2017-11-07 | End: 2017-11-07 | Stop reason: SURG

## 2017-11-07 RX ORDER — FENTANYL CITRATE 50 UG/ML
INJECTION, SOLUTION INTRAMUSCULAR; INTRAVENOUS AS NEEDED
Status: DISCONTINUED | OUTPATIENT
Start: 2017-11-07 | End: 2017-11-07 | Stop reason: SURG

## 2017-11-07 RX ORDER — PROPOFOL 10 MG/ML
INJECTION, EMULSION INTRAVENOUS AS NEEDED
Status: DISCONTINUED | OUTPATIENT
Start: 2017-11-07 | End: 2017-11-07 | Stop reason: SURG

## 2017-11-07 RX ORDER — MIDAZOLAM HYDROCHLORIDE 1 MG/ML
INJECTION INTRAMUSCULAR; INTRAVENOUS AS NEEDED
Status: DISCONTINUED | OUTPATIENT
Start: 2017-11-07 | End: 2017-11-07 | Stop reason: SURG

## 2017-11-07 RX ORDER — SODIUM CHLORIDE, SODIUM LACTATE, POTASSIUM CHLORIDE, CALCIUM CHLORIDE 600; 310; 30; 20 MG/100ML; MG/100ML; MG/100ML; MG/100ML
100 INJECTION, SOLUTION INTRAVENOUS CONTINUOUS
Status: DISCONTINUED | OUTPATIENT
Start: 2017-11-07 | End: 2017-11-07 | Stop reason: HOSPADM

## 2017-11-07 RX ORDER — SODIUM CHLORIDE, SODIUM LACTATE, POTASSIUM CHLORIDE, CALCIUM CHLORIDE 600; 310; 30; 20 MG/100ML; MG/100ML; MG/100ML; MG/100ML
125 INJECTION, SOLUTION INTRAVENOUS CONTINUOUS
Status: DISCONTINUED | OUTPATIENT
Start: 2017-11-07 | End: 2017-11-07

## 2017-11-07 RX ORDER — FENTANYL CITRATE/PF 50 MCG/ML
50 SYRINGE (ML) INJECTION
Status: DISCONTINUED | OUTPATIENT
Start: 2017-11-07 | End: 2017-11-07 | Stop reason: HOSPADM

## 2017-11-07 RX ORDER — HEPARIN SODIUM 5000 [USP'U]/ML
5000 INJECTION, SOLUTION INTRAVENOUS; SUBCUTANEOUS
Status: DISCONTINUED | OUTPATIENT
Start: 2017-11-07 | End: 2017-11-07 | Stop reason: HOSPADM

## 2017-11-07 RX ORDER — IBUPROFEN 600 MG/1
600 TABLET ORAL EVERY 6 HOURS PRN
Qty: 30 TABLET | Refills: 0 | Status: SHIPPED | OUTPATIENT
Start: 2017-11-07

## 2017-11-07 RX ORDER — MAGNESIUM HYDROXIDE 1200 MG/15ML
LIQUID ORAL AS NEEDED
Status: DISCONTINUED | OUTPATIENT
Start: 2017-11-07 | End: 2017-11-07 | Stop reason: HOSPADM

## 2017-11-07 RX ORDER — DEXAMETHASONE SODIUM PHOSPHATE 4 MG/ML
INJECTION, SOLUTION INTRA-ARTICULAR; INTRALESIONAL; INTRAMUSCULAR; INTRAVENOUS; SOFT TISSUE AS NEEDED
Status: DISCONTINUED | OUTPATIENT
Start: 2017-11-07 | End: 2017-11-07 | Stop reason: SURG

## 2017-11-07 RX ADMIN — LEVOFLOXACIN 500 MG: 5 INJECTION, SOLUTION INTRAVENOUS at 07:58

## 2017-11-07 RX ADMIN — PROPOFOL 100 MG: 10 INJECTION, EMULSION INTRAVENOUS at 08:05

## 2017-11-07 RX ADMIN — MIDAZOLAM HYDROCHLORIDE 2 MG: 1 INJECTION, SOLUTION INTRAMUSCULAR; INTRAVENOUS at 07:55

## 2017-11-07 RX ADMIN — PROPOFOL 200 MG: 10 INJECTION, EMULSION INTRAVENOUS at 07:59

## 2017-11-07 RX ADMIN — HEPARIN SODIUM 5000 UNITS: 5000 INJECTION, SOLUTION INTRAVENOUS; SUBCUTANEOUS at 07:50

## 2017-11-07 RX ADMIN — PROPOFOL 100 MG: 10 INJECTION, EMULSION INTRAVENOUS at 08:00

## 2017-11-07 RX ADMIN — FENTANYL CITRATE 50 MCG: 50 INJECTION, SOLUTION INTRAMUSCULAR; INTRAVENOUS at 07:55

## 2017-11-07 RX ADMIN — PROPOFOL 100 MG: 10 INJECTION, EMULSION INTRAVENOUS at 08:10

## 2017-11-07 RX ADMIN — SODIUM CHLORIDE, POTASSIUM CHLORIDE, SODIUM LACTATE AND CALCIUM CHLORIDE 125 ML/HR: 600; 310; 30; 20 INJECTION, SOLUTION INTRAVENOUS at 07:20

## 2017-11-07 RX ADMIN — LEVOFLOXACIN 500 MG: 5 INJECTION, SOLUTION INTRAVENOUS at 07:32

## 2017-11-07 RX ADMIN — FENTANYL CITRATE 100 MCG: 50 INJECTION, SOLUTION INTRAMUSCULAR; INTRAVENOUS at 07:59

## 2017-11-07 RX ADMIN — DEXAMETHASONE SODIUM PHOSPHATE 8 MG: 4 INJECTION, SOLUTION INTRA-ARTICULAR; INTRALESIONAL; INTRAMUSCULAR; INTRAVENOUS; SOFT TISSUE at 08:09

## 2017-11-07 RX ADMIN — METOCLOPRAMIDE HYDROCHLORIDE 10 MG: 5 INJECTION INTRAMUSCULAR; INTRAVENOUS at 08:09

## 2017-11-07 RX ADMIN — GLYCOPYRROLATE 0.2 MG: 0.2 INJECTION, SOLUTION INTRAMUSCULAR; INTRAVENOUS at 07:59

## 2017-11-07 RX ADMIN — MIDAZOLAM HYDROCHLORIDE 2 MG: 1 INJECTION, SOLUTION INTRAMUSCULAR; INTRAVENOUS at 07:59

## 2017-11-07 RX ADMIN — FENTANYL CITRATE 50 MCG: 50 INJECTION, SOLUTION INTRAMUSCULAR; INTRAVENOUS at 08:05

## 2017-11-07 NOTE — DISCHARGE INSTRUCTIONS
Dr Jonah Rossi  Discharge Instructions      · No driving or operating machinery for 24 hours or while taking pain medication  No driving until seen by Dr Jonah Rossi following any abdominal surgery  · No alcoholic beverages for 24 hours or while taking pain medication  · DO NOT make any important personal or business decisions for 24 hours  Diet:  · Begin with liquids and light food and progress to your normal diet unless you are nauseated or otherwise instructed by your physician  Medication:  · Begin taking pain medication as directed and remember that narcotic medications may be constipating  Consider starting over the counter stool softeners  If constipation persists begin taking over the counter laxative  Dressing:  · Remove dressing in 2 days and may shower  · After first shower you may leave incision open to air or reapply a small dressing or Band-Aid       · May apply ice to incision unless otherwise directed      Activity  · No sex, strenuous exercise, or heavy lifting after any abdominal surgery    Make an appointment to see Dr Jonah Rossi in 7 days

## 2017-11-07 NOTE — OP NOTE
OPERATIVE REPORT  PATIENT NAME: Chacorta Nguyen    :  1965  MRN: [id number]  Pt Location: [facility] OR ROOM 02    SURGERY DATE: 2017    Surgeon(s) and Role:     * Shaun Bamberger, MD - Primary    Preop Diagnosis:  Unilateral inguinal hernia without obstruction or gangrene [K40 90]    Post-Op Diagnosis Codes:     * Unilateral inguinal hernia without obstruction or gangrene [K40 90]    Procedure(s) (LRB):  INGUINAL HERNIA REPAIR WITH MESH (Right)    Specimen(s):  ID Type Source Tests Collected by Time Destination   1 : lipoma of right cord Tissue Lipoma of cord TISSUE EXAM Shaun Bamberger, MD 2017 0831        Estimated Blood Loss:   Minimal    Drains:       Anesthesia Type:   General    Operative Indications:  Unilateral inguinal hernia without obstruction or gangrene [K40 90]      Operative Findings:  Large direct right inguinal hernia    Complications:   None    Procedure and Technique:  The patient was taken to the operating room on this day at [facility] where a time-out was called identifying Chacorta Nguyen  A yes was marked in the right groin in the holding area  The time-out included the patient's name, date of birth, procedure to be done, side of procedure, site of procedure,  area marked, prepped and a dry, anesthesia risk of three, allergies to Percocet Vicodin and penicillin and tramadol  Fire risk of 2-3  This was agreed to by the anesthesiologist, scrub and circulating nurses, RNFA and myself  Once adequate general anesthesia was obtained the abdomen and groin were prepped and draped in normal fashion  An incision was made in the groin carried through the skin and subcutaneous tissue  Bleeders were clamped and coagulated  The external oblique aponeurosis was opened course of its fibers extending through the external ring after 0 5% Sensorcaine was infiltrated    Cord structures and hernia contents were dissected in the region of the pubic tubercle and surrounded with use of a Penrose drain  No evidence of indirect hernia was found  A lipoma was found divided and sent to pathology for evaluation  A large direct hernia was found occupying the posterior floor  Transversalis fascia was opened the hernia was reduced  The Ray-Viviane sponge was inserted to create space for the Prolene HERNIA SYSTEM  PHSL  [id number]  USE BY 12/31/2021  THE UNDERLAY OF THE MESH WAS DEPLOYED IN THE TRANSVERSALIS FASCIA WAS REAPPROXIMATED WITH A CONTINUOUS SUTURE OF 0 CHROMIC  THE OVERLAY OF THE MESH WAS NEXT APPLIED  TWO SUTURES OF TWO 0 VICRYL WERE USED  THE SUTURE COMBINED THE MESH TO THE PUBIC TUBERCLE  INTERRUPTED SUTURES COMBINE THE MESH TO THE CONJOINED TENDON a slit was made at the mesh at the cord structures recreating the internal ring by suturing the mesh shelving edge of Poupart's ligament back to the mesh at this time  A hemostat was inserted to ensure enough space for viability of the cord structures which was accomplished  Superior mesh had been deployed  Final sutures combined the mesh to the shelving edge of Poupart's ligament USP between the pubic tubercle and internal ring and superior to the internal ring  Cord structures and nerves were placed in the normal position  The external oblique aponeurosis was closed with a continuous suture of 2-0 PDS  Betadine was applied to the subcutaneous tissue  Darya's fascia and subcutaneous tissue was closed in layers with interrupted sutures of 3-0 plain  The skin was closed with subcuticular stitch of 4-0 PDS  0 5% Sensorcaine was infiltrated as a nerve block one fingerbreadth medial to the anterior superior iliac spine followed next by the course of the incision  Steri-Strips were applied  Sterile dressings were applied  Sponge and count were correct   Patient tolerated procedure well returned to PACU   I was present for the entire procedure    Patient Disposition:  PACU     SIGNATURE: Aleena Stokes MD  DATE: November 7, 2017  TIME: 8:49 AM

## 2017-11-07 NOTE — PERIOPERATIVE NURSING NOTE
Received from pacu fully awake and alert  oob to bathroom voided large amount clear yellow urine  Pain 0  tegaderm dressing to right inguinal area dry and intact steri strips to left inguinal area intact  Sutures removed from left side today  Ice pack to right inguinal area  Po fluids given  Has already tolerated well in pacu

## 2018-01-13 VITALS
SYSTOLIC BLOOD PRESSURE: 120 MMHG | OXYGEN SATURATION: 95 % | WEIGHT: 230 LBS | DIASTOLIC BLOOD PRESSURE: 78 MMHG | HEART RATE: 93 BPM | TEMPERATURE: 97.5 F | HEIGHT: 68 IN | BODY MASS INDEX: 34.86 KG/M2

## 2018-01-14 VITALS
HEART RATE: 85 BPM | BODY MASS INDEX: 34.86 KG/M2 | TEMPERATURE: 98.6 F | HEIGHT: 68 IN | WEIGHT: 230 LBS | OXYGEN SATURATION: 94 % | DIASTOLIC BLOOD PRESSURE: 80 MMHG | SYSTOLIC BLOOD PRESSURE: 120 MMHG

## 2018-07-19 ENCOUNTER — OFFICE VISIT (OUTPATIENT)
Dept: SURGERY | Facility: CLINIC | Age: 53
End: 2018-07-19
Payer: COMMERCIAL

## 2018-07-19 VITALS
WEIGHT: 241 LBS | HEIGHT: 68 IN | SYSTOLIC BLOOD PRESSURE: 138 MMHG | TEMPERATURE: 98.6 F | DIASTOLIC BLOOD PRESSURE: 80 MMHG | HEART RATE: 80 BPM | BODY MASS INDEX: 36.53 KG/M2

## 2018-07-19 DIAGNOSIS — Z09 S/P RIGHT INGUINAL HERNIA REPAIR, FOLLOW-UP EXAM: ICD-10-CM

## 2018-07-19 DIAGNOSIS — Z09 STATUS POST LEFT INGUINAL HERNIA REPAIR, FOLLOW-UP EXAM: Primary | ICD-10-CM

## 2018-07-19 DIAGNOSIS — R10.32 LEFT INGUINAL PAIN: ICD-10-CM

## 2018-07-19 DIAGNOSIS — Z87.19 H/O VENTRAL HERNIA: ICD-10-CM

## 2018-07-19 DIAGNOSIS — R10.31 RIGHT INGUINAL PAIN: ICD-10-CM

## 2018-07-19 PROCEDURE — 99214 OFFICE O/P EST MOD 30 MIN: CPT | Performed by: SPECIALIST

## 2018-07-19 NOTE — PROGRESS NOTES
Assessment/Plan:  Renetta Pinedo had right And left inguinal hernia repairs and ventral hernia repair November of 2017  He was doing great  He went to his medical doctor for suture removal as that is where he thought he was post ago  In the past month he has had urinary trouble with trickling of his urinary stream   The examination for hernias are normal   There is no evidence of recurrence  He needs to see his urologist   He needs to contact Dr Martini:  Tomorrow for him to get an appointment  He is doing very well from the hernia point of view  Diagnoses and all orders for this visit:    Status post left inguinal hernia repair, follow-up exam    S/P right inguinal hernia repair, follow-up exam    H/O ventral hernia    Left inguinal pain    Right inguinal pain          Subjective:     Patient ID: Jose Alejandro Farnsworth is a 47 y o  male  Renetta Pinedo had right and left inguinal hernia repairs and a ventral hernia repair in November of 2017  He did not come back for suture removal because he thought he was going to his family doctor for that  He was doing well to one month ago when he started having urinary problems  The urine just struggles out  He is also noted discoloration under his penis and scrotum  He comes to recheck to hernias to make sure they are doing okay  He has lost 10 lb  Review of Systems    status post right and left inguinal hernia and ventral hernia repair in in November of 2017    Objective:     Physical Exam   Abdominal:   Abdomen soft nontender  No evidence of recurrent right or left inguinal hernia  Incisions well healed  Ventral hernia well healed  No evidence of recurrence  Blackish discoloration under penis and on to scrotum

## 2018-07-19 NOTE — LETTER
July 19, 2018     Orlando Miller MD  [address] [place] [address]    Patient: Sharee Dodd   YOB: 1965   Date of Visit: 7/19/2018       Dear Dr Melinda Medellin:    Thank you for referring Sharee Dodd to me for evaluation  Below are my notes for this consultation  If you have questions, please do not hesitate to call me  I look forward to following your patient along with you  Sincerely,        Sonia Rosales MD        CC: MD Sonia Francis MD  7/19/2018  3:25 PM  Sign at close encounter  Assessment/Plan:  Fely Corona had right And left inguinal hernia repairs and ventral hernia repair November of 2017  He was doing great  He went to his medical doctor for suture removal as that is where he thought he was post ago  In the past month he has had urinary trouble with trickling of his urinary stream   The examination for hernias are normal   There is no evidence of recurrence  He needs to see his urologist   He needs to contact Dr Martini:  Tomorrow for him to get an appointment  He is doing very well from the hernia point of view  Diagnoses and all orders for this visit:    Status post left inguinal hernia repair, follow-up exam    S/P right inguinal hernia repair, follow-up exam    H/O ventral hernia    Left inguinal pain    Right inguinal pain          Subjective:     Patient ID: Sharee Dodd is a 47 y o  male  Fely Corona had right and left inguinal hernia repairs and a ventral hernia repair in November of 2017  He did not come back for suture removal because he thought he was going to his family doctor for that  He was doing well to one month ago when he started having urinary problems  The urine just struggles out  He is also noted discoloration under his penis and scrotum  He comes to recheck to hernias to make sure they are doing okay  He has lost 10 lb          Review of Systems    status post right and left inguinal hernia and ventral hernia repair in in November of 2017    Objective:     Physical Exam   Abdominal:   Abdomen soft nontender  No evidence of recurrent right or left inguinal hernia  Incisions well healed  Ventral hernia well healed  No evidence of recurrence  Blackish discoloration under penis and on to scrotum

## 2023-10-05 ENCOUNTER — OFFICE VISIT (OUTPATIENT)
Dept: GASTROENTEROLOGY | Facility: CLINIC | Age: 58
End: 2023-10-05
Payer: COMMERCIAL

## 2023-10-05 VITALS
WEIGHT: 233 LBS | BODY MASS INDEX: 34.51 KG/M2 | SYSTOLIC BLOOD PRESSURE: 124 MMHG | HEART RATE: 77 BPM | HEIGHT: 69 IN | DIASTOLIC BLOOD PRESSURE: 84 MMHG | OXYGEN SATURATION: 94 %

## 2023-10-05 DIAGNOSIS — R10.13 EPIGASTRIC PAIN: Primary | ICD-10-CM

## 2023-10-05 DIAGNOSIS — R11.0 NAUSEA: ICD-10-CM

## 2023-10-05 DIAGNOSIS — K76.0 FATTY LIVER: ICD-10-CM

## 2023-10-05 PROCEDURE — 99204 OFFICE O/P NEW MOD 45 MIN: CPT | Performed by: PHYSICIAN ASSISTANT

## 2023-10-05 RX ORDER — QUETIAPINE FUMARATE 100 MG/1
100 TABLET, FILM COATED ORAL
COMMUNITY
Start: 2023-09-12

## 2023-10-05 RX ORDER — DIVALPROEX SODIUM 500 MG/1
500 TABLET, DELAYED RELEASE ORAL DAILY
COMMUNITY

## 2023-10-05 RX ORDER — ARIPIPRAZOLE 5 MG/1
5 TABLET ORAL DAILY
COMMUNITY
Start: 2023-09-12

## 2023-10-05 RX ORDER — PRAVASTATIN SODIUM 10 MG
10 TABLET ORAL
COMMUNITY
Start: 2023-09-10

## 2023-10-05 RX ORDER — SULFAMETHOXAZOLE AND TRIMETHOPRIM 800; 160 MG/1; MG/1
1 TABLET ORAL EVERY 12 HOURS
COMMUNITY
Start: 2023-09-27

## 2023-10-05 RX ORDER — LEVOTHYROXINE SODIUM 0.1 MG/1
TABLET ORAL
COMMUNITY
Start: 2023-09-10

## 2023-10-05 RX ORDER — DIAZEPAM 5 MG/1
5 TABLET ORAL 3 TIMES DAILY PRN
COMMUNITY
Start: 2023-09-13

## 2023-10-05 NOTE — LETTER
October 5, 2023     Harjit Headings, DO  [address]  [id number]    Patient: Mi Shelby   YOB: 1965   Date of Visit: 10/5/2023       Dear Dr. Katrin Xiong: Thank you for referring Mi Shelby to me for evaluation. Below are my notes for this consultation. If you have questions, please do not hesitate to call me. I look forward to following your patient along with you. Sincerely,        Cezar Du PA-C        CC: No Recipients    Cezar Du Karleedarinel Stewartor  10/5/2023 10:58 AM  Incomplete  [facility] - New Patient Office Visit  Mi Shelby 65 y.o. male MRN: [id number]  Encounter: [id number]          ASSESSMENT AND PLAN:      1. Epigastric pain  -Over the past year intermittent  -Increased with food  -Plan EGD to rule out peptic ulcer disease  - No pacemaker or defibrillator implanted. No chronic kidney disease or solitary kidney. Not on any supplemental oxygen at night. Not on any antiplatelet or anticoagulants. - This risks of this procedure include but are not limited to; anesthesia complications, injury/perforation of the bowel, infection and bleeding. 2. Nausea  -Over the past year    3. Fatty liver  -Noted on 2017 CAT scan done for trauma but only noted fatty liver  -Get recent blood work and US    4. Family history of colon cancer with Maternal Aunt  - screen colon around age 49 with no polyps removed  - next colonoscopy 2027    ______________________________________________________________________    Chief Complaint: Abdominal pain, nausea, fatty liver    HPI: This is a 52-year-old white male new to our office with past medical history of asthma, depression, hypothyroidism, hyperlipidemia who presents with complaints of abdominal cramping, nausea vomiting and history of fatty liver. No recent labs available. Last imaging of the abdomen was a CT in 2017 noted fatty liver. Recent US noted fatty liver. Previously drank every day. Was drinking about a 6 pack of beer. Has been doing this for many years. Post prandial bloating as well. No change in bowels. No black or bloody stools. Endoscopy History:  EGD -none previously  Colonoscopy - Not sure how long ago    REVIEW OF SYSTEMS:    CONSTITUTIONAL: Denies any fever, chills, rigors, and weight loss. HEENT: Denies hearing loss or visual disturbances. CARDIOVASCULAR: No chest pain or palpitations. RESPIRATORY: Denies any cough, hemoptysis, shortness of breath or dyspnea on exertion. GASTROINTESTINAL: As noted in the History of Present Illness. GENITOURINARY: No problems with urination. Denies any hematuria or dysuria. NEUROLOGIC: No dizziness or vertigo, denies headaches. MUSCULOSKELETAL: Denies any muscle or joint pain. SKIN: Denies skin rashes or itching. ENDOCRINE: Denies excessive thirst. Denies intolerance to heat or cold. PSYCHOSOCIAL: Denies depression or anxiety. Denies any recent memory loss.        Historical Information   Past Medical History:   Diagnosis Date   • Anxiety    • COPD (chronic obstructive pulmonary disease) ([address])    • Hyperlipidemia    • Hypertension    • Obesity      Past Surgical History:   Procedure Laterality Date   • DENTAL SURGERY     • HERNIA REPAIR     • AR REPAIR FIRST ABDOMINAL WALL HERNIA N/A 9/27/2017    Procedure: REPAIR HERNIA VENTRAL WITH MESH;  Surgeon: Albert Cervantes MD;  Location: [facility];  Service: General   • AR RPR 1ST INGUN HRNA AGE 5 YRS/> REDUCIBLE Left 10/31/2017    Procedure: INGUINAL HERNIA REPAIR WITH MESH;  Surgeon: Albert Cervantes MD;  Location: [facility];  Service: General   • AR RPR 1ST INGUN HRNA AGE 5 YRS/> REDUCIBLE Right 11/7/2017    Procedure: INGUINAL HERNIA REPAIR WITH MESH;  Surgeon: Albert Cervantes MD;  Location: [facility];  Service: General   • TRACHEOSTOMY      with a trauma 1989     Social History   Social History     Substance and Sexual Activity   Alcohol Use Not Currently    Comment: quit 7/2016 (stopped 33 days ago per 10/5/23 gi apt)     Social History     Substance and Sexual Activity   Drug Use Yes   • Types: Marijuana    Comment: " used everything x 10 years none since 1989     Social History     Tobacco Use   Smoking Status Every Day   • Packs/day: 0.50   • Years: 30.00   • Total pack years: 15.00   • Types: Cigarettes   Smokeless Tobacco Never     Family History   Problem Relation Age of Onset   • Colon cancer Family        Meds/Allergies       Current Outpatient Medications:   •  acetaminophen (TYLENOL) 325 mg tablet  •  albuterol (PROVENTIL HFA,VENTOLIN HFA) 90 mcg/act inhaler  •  amLODIPine (NORVASC) 5 mg tablet  •  ARIPiprazole (ABILIFY) 5 mg tablet  •  diazepam (VALIUM) 5 mg tablet  •  divalproex sodium (DEPAKOTE) 500 mg DR tablet  •  escitalopram (LEXAPRO) 10 mg tablet  •  levothyroxine 100 mcg tablet  •  lisinopril (ZESTRIL) 20 mg tablet  •  pravastatin (PRAVACHOL) 10 mg tablet  •  QUEtiapine (SEROquel) 100 mg tablet  •  sulfamethoxazole-trimethoprim (BACTRIM DS) 800-160 mg per tablet  •  ALPRAZolam (XANAX) 1 mg tablet  •  ibuprofen (MOTRIN) 600 mg tablet    Allergies   Allergen Reactions   • Barium Sulfate    • Lipitor [Atorvastatin]    • Penicillins Hives   • Clindamycin Rash   • Percocet [Oxycodone-Acetaminophen] Rash   • Tramadol Rash   • Vicodin [Hydrocodone-Acetaminophen] Rash           Objective     Blood pressure 124/84, pulse 77, height 5' 8.5" (1.74 m), weight 106 kg (233 lb), SpO2 94 %. Body mass index is 34.91 kg/m². PHYSICAL EXAM:      General Appearance:   Alert, cooperative, no distress, appears stated age   HEENT:   Normocephalic, atraumatic, anicteric. Neck:  Supple, symmetrical   Lungs:   Clear to auscultation bilaterally; no rales, rhonchi or wheezing; respirations unlabored    Heart[de-identified]   Regular rate and rhythm; no murmur, rub, or gallop.    Abdomen:   Soft, non-tender, non-distended; normal bowel sounds; no masses, no organomegaly    Genitalia:   Deferred    Rectal:   Deferred Extremities:  No cyanosis, clubbing or edema    Pulses:  Not assessed   Skin:  No jaundice, rashes, or lesions    Lymph nodes:  Not assessed       Lab Results:   No visits with results within 1 Day(s) from this visit. Latest known visit with results is:   Admission on 11/07/2017, Discharged on 11/07/2017   Component Date Value   • Case Report 11/07/2017                      Value:Surgical Pathology Report                         Case: [id number]                                   Authorizing Provider:  Marcello Stewart MD               Collected:           11/07/2017 0831              Ordering Location:     [address] Received:            11/07/2017 [facility]                                     Operating Room                                                               Pathologist:           Shahid Amanda MD                                                         Specimen:    Lipoma of cord, lipoma of right cord                                                      • Final Diagnosis 11/07/2017                      Value: This result contains rich text formatting which cannot be displayed here. • Additional Information 11/07/2017                      Value: This result contains rich text formatting which cannot be displayed here. • Gross Description 11/07/2017                      Value: This result contains rich text formatting which cannot be displayed here. Radiology Results:   No results found. Sherrill Ansari PA-C  10/5/2023 10:50 AM  Sign when Signing Visit  [facility] - New Patient Office Visit  Fidel Holt 65 y.o. male MRN: [id number]  Encounter: [id number]          ASSESSMENT AND PLAN:      1. Epigastric pain  -Over the past year intermittent  -Increased with food  -Plan EGD to rule out peptic ulcer disease  - No pacemaker or defibrillator implanted. No chronic kidney disease or solitary kidney.   Not on any supplemental oxygen at night. Not on any antiplatelet or anticoagulants. - This risks of this procedure include but are not limited to; anesthesia complications, injury/perforation of the bowel, infection and bleeding. 2. Nausea  -Over the past year    3. Fatty liver  -Noted on 2017 CAT scan done for trauma but only noted fatty liver  -Get CBC and CMP    ______________________________________________________________________    Chief Complaint: Abdominal pain, nausea, fatty liver    HPI: This is a 52-year-old white male new to our office with past medical history of asthma, depression, hypothyroidism, hyperlipidemia who presents with complaints of abdominal cramping, nausea vomiting and history of fatty liver. No recent labs available. Last imaging of the abdomen was a CT in 2017 noted fatty liver. Endoscopy History:  EGD -none previously  Colonoscopy - Not sure how long ago    REVIEW OF SYSTEMS:    CONSTITUTIONAL: Denies any fever, chills, rigors, and weight loss. HEENT: Denies hearing loss or visual disturbances. CARDIOVASCULAR: No chest pain or palpitations. RESPIRATORY: Denies any cough, hemoptysis, shortness of breath or dyspnea on exertion. GASTROINTESTINAL: As noted in the History of Present Illness. GENITOURINARY: No problems with urination. Denies any hematuria or dysuria. NEUROLOGIC: No dizziness or vertigo, denies headaches. MUSCULOSKELETAL: Denies any muscle or joint pain. SKIN: Denies skin rashes or itching. ENDOCRINE: Denies excessive thirst. Denies intolerance to heat or cold. PSYCHOSOCIAL: Denies depression or anxiety. Denies any recent memory loss.        Historical Information  Past Medical History:   Diagnosis Date   • Anxiety    • COPD (chronic obstructive pulmonary disease) ([address])    • Hyperlipidemia    • Hypertension    • Obesity      Past Surgical History:   Procedure Laterality Date   • DENTAL SURGERY     • HERNIA REPAIR     • MD REPAIR FIRST ABDOMINAL WALL HERNIA N/A 9/27/2017 Procedure: REPAIR HERNIA VENTRAL WITH MESH;  Surgeon: Sherry Aparicio MD;  Location: [facility];  Service: General   • W[place] RPR 1ST INGUN HRNA AGE 5 YRS/> REDUCIBLE Left 10/31/2017    Procedure: INGUINAL HERNIA REPAIR WITH MESH;  Surgeon: Sherry Aparicio MD;  Location: [facility];  Service: General   • W[place] RPR 1ST INGUN HRNA AGE 5 YRS/> REDUCIBLE Right 11/7/2017    Procedure: INGUINAL HERNIA REPAIR WITH MESH;  Surgeon: Sherry Aparicio MD;  Location: [facility];  Service: General   • TRACHEOSTOMY      with a trauma 1989     Social History  Social History     Substance and Sexual Activity   Alcohol Use Not Currently    Comment: quit 7/2016 (stopped 33 days ago per 10/5/23 gi apt)     Social History     Substance and Sexual Activity   Drug Use Yes   • Types: Marijuana    Comment: " used everything x 10 years none since 1989     Social History     Tobacco Use   Smoking Status Every Day   • Packs/day: 0.50   • Years: 30.00   • Total pack years: 15.00   • Types: Cigarettes   Smokeless Tobacco Never     Family History   Problem Relation Age of Onset   • Colon cancer Family        Meds/Allergies      Current Outpatient Medications:   •  acetaminophen (TYLENOL) 325 mg tablet  •  albuterol (PROVENTIL HFA,VENTOLIN HFA) 90 mcg/act inhaler  •  amLODIPine (NORVASC) 5 mg tablet  •  ARIPiprazole (ABILIFY) 5 mg tablet  •  diazepam (VALIUM) 5 mg tablet  •  divalproex sodium (DEPAKOTE) 500 mg DR tablet  •  escitalopram (LEXAPRO) 10 mg tablet  •  levothyroxine 100 mcg tablet  •  lisinopril (ZESTRIL) 20 mg tablet  •  pravastatin (PRAVACHOL) 10 mg tablet  •  QUEtiapine (SEROquel) 100 mg tablet  •  sulfamethoxazole-trimethoprim (BACTRIM DS) 800-160 mg per tablet  •  ALPRAZolam (XANAX) 1 mg tablet  •  ibuprofen (MOTRIN) 600 mg tablet    Allergies   Allergen Reactions   • Barium Sulfate    • Lipitor [Atorvastatin]    • Penicillins Hives   • Clindamycin Rash   • Percocet [Oxycodone-Acetaminophen] Rash   • Tramadol Rash   • Vicodin [Hydrocodone-Acetaminophen] Rash           Objective    Blood pressure 124/84, pulse 77, height 5' 8.5" (1.74 m), weight 106 kg (233 lb), SpO2 94 %. Body mass index is 34.91 kg/m². PHYSICAL EXAM:      General Appearance:   Alert, cooperative, no distress, appears stated age   HEENT:   Normocephalic, atraumatic, anicteric. Neck:  Supple, symmetrical   Lungs:   Clear to auscultation bilaterally; no rales, rhonchi or wheezing; respirations unlabored    Heart[de-identified]   Regular rate and rhythm; no murmur, rub, or gallop. Abdomen:   Soft, non-tender, non-distended; normal bowel sounds; no masses, no organomegaly    Genitalia:   Deferred    Rectal:   Deferred    Extremities:  No cyanosis, clubbing or edema    Pulses:  Not assessed   Skin:  No jaundice, rashes, or lesions    Lymph nodes:  Not assessed       Lab Results:   No visits with results within 1 Day(s) from this visit. Latest known visit with results is:   Admission on 11/07/2017, Discharged on 11/07/2017   Component Date Value   • Case Report 11/07/2017                      Value:Surgical Pathology Report                         Case: [id number]                                   Authorizing Provider:  Diego Sullivan MD               Collected:           11/07/2017 0831              Ordering Location:     [facility] Received:            11/07/2017 [facility]                                     Operating Room                                                               Pathologist:           Trevor Caballero MD                                                         Specimen:    Lipoma of cord, lipoma of right cord                                                      • Final Diagnosis 11/07/2017                      Value: This result contains rich text formatting which cannot be displayed here. • Additional Information 11/07/2017                      Value: This result contains rich text formatting which cannot be displayed here.    • Gross Description 11/07/2017 Value:This result contains rich text formatting which cannot be displayed here. Radiology Results:   No results found. Bandar Yanes PA-C

## 2023-10-05 NOTE — PATIENT INSTRUCTIONS
Scheduled date of EGD(as of today): 10/11/23  Physician performing EGD: Dr. Valeria Ferguson  Location of EGD: [facility]  Instructions reviewed with patient by: Laura LATHAM  Clearances: n/a

## 2023-10-05 NOTE — PROGRESS NOTES
[facility] - New Patient Office Visit  Natasha Jin 65 y.o. male MRN: [id number]  Encounter: [id number]          ASSESSMENT AND PLAN:      1. Epigastric pain  -Over the past year intermittent  -Increased with food  -Plan EGD to rule out peptic ulcer disease  - No pacemaker or defibrillator implanted. No chronic kidney disease or solitary kidney. Not on any supplemental oxygen at night. Not on any antiplatelet or anticoagulants. - This risks of this procedure include but are not limited to; anesthesia complications, injury/perforation of the bowel, infection and bleeding. 2. Nausea  -Over the past year    3. Fatty liver  -Noted on 2017 CAT scan done for trauma but only noted fatty liver  -Get recent blood work and US    4. Family history of colon cancer with Maternal Aunt  - screen colon around age 49 with no polyps removed  - next colonoscopy 2027    ______________________________________________________________________    Chief Complaint: Abdominal pain, nausea, fatty liver    HPI: This is a 78-year-old white male new to our office with past medical history of asthma, depression, hypothyroidism, hyperlipidemia who presents with complaints of abdominal cramping, nausea vomiting and history of fatty liver. No recent labs available. Last imaging of the abdomen was a CT in 2017 noted fatty liver. Recent US noted fatty liver. Previously drank every day. Was drinking about a 6 pack of beer. Has been doing this for many years. Post prandial bloating as well. No change in bowels. No black or bloody stools. Endoscopy History:  EGD -none previously  Colonoscopy - Not sure how long ago    REVIEW OF SYSTEMS:    CONSTITUTIONAL: Denies any fever, chills, rigors, and weight loss. HEENT: Denies hearing loss or visual disturbances. CARDIOVASCULAR: No chest pain or palpitations.    RESPIRATORY: Denies any cough, hemoptysis, shortness of breath or dyspnea on exertion. GASTROINTESTINAL: As noted in the History of Present Illness. GENITOURINARY: No problems with urination. Denies any hematuria or dysuria. NEUROLOGIC: No dizziness or vertigo, denies headaches. MUSCULOSKELETAL: Denies any muscle or joint pain. SKIN: Denies skin rashes or itching. ENDOCRINE: Denies excessive thirst. Denies intolerance to heat or cold. PSYCHOSOCIAL: Denies depression or anxiety. Denies any recent memory loss.        Historical Information   Past Medical History:   Diagnosis Date   • Anxiety    • COPD (chronic obstructive pulmonary disease) ([address])    • Hyperlipidemia    • Hypertension    • Obesity      Past Surgical History:   Procedure Laterality Date   • DENTAL SURGERY     • HERNIA REPAIR     • [place] REPAIR FIRST ABDOMINAL WALL HERNIA N/A 9/27/2017    Procedure: REPAIR HERNIA VENTRAL WITH MESH;  Surgeon: Laura Tay MD;  Location: [facility];  Service: General   • CA RPR 1ST INGUN HRNA AGE 5 YRS/> REDUCIBLE Left 10/31/2017    Procedure: INGUINAL HERNIA REPAIR WITH MESH;  Surgeon: Laura Tay MD;  Location: [facility];  Service: General   • CA RPR 1ST INGUN HRNA AGE 5 YRS/> REDUCIBLE Right 11/7/2017    Procedure: INGUINAL HERNIA REPAIR WITH MESH;  Surgeon: Laura Tay MD;  Location: [facility];  Service: General   • TRACHEOSTOMY      with a trauma 1989     Social History   Social History     Substance and Sexual Activity   Alcohol Use Not Currently    Comment: quit 7/2016 (stopped 33 days ago per 10/5/23 gi apt)     Social History     Substance and Sexual Activity   Drug Use Yes   • Types: Marijuana    Comment: " used everything x 10 years none since 1989     Social History     Tobacco Use   Smoking Status Every Day   • Packs/day: 0.50   • Years: 30.00   • Total pack years: 15.00   • Types: Cigarettes   Smokeless Tobacco Never     Family History   Problem Relation Age of Onset   • Colon cancer Family        Meds/Allergies       Current Outpatient Medications:   •  acetaminophen (TYLENOL) 325 mg tablet  •  albuterol (PROVENTIL HFA,VENTOLIN HFA) 90 mcg/act inhaler  •  amLODIPine (NORVASC) 5 mg tablet  •  ARIPiprazole (ABILIFY) 5 mg tablet  •  diazepam (VALIUM) 5 mg tablet  •  divalproex sodium (DEPAKOTE) 500 mg DR tablet  •  escitalopram (LEXAPRO) 10 mg tablet  •  levothyroxine 100 mcg tablet  •  lisinopril (ZESTRIL) 20 mg tablet  •  pravastatin (PRAVACHOL) 10 mg tablet  •  QUEtiapine (SEROquel) 100 mg tablet  •  sulfamethoxazole-trimethoprim (BACTRIM DS) 800-160 mg per tablet  •  ALPRAZolam (XANAX) 1 mg tablet  •  ibuprofen (MOTRIN) 600 mg tablet    Allergies   Allergen Reactions   • Barium Sulfate    • Lipitor [Atorvastatin]    • Penicillins Hives   • Clindamycin Rash   • Percocet [Oxycodone-Acetaminophen] Rash   • Tramadol Rash   • Vicodin [Hydrocodone-Acetaminophen] Rash           Objective     Blood pressure 124/84, pulse 77, height 5' 8.5" (1.74 m), weight 106 kg (233 lb), SpO2 94 %. Body mass index is 34.91 kg/m². PHYSICAL EXAM:      General Appearance:   Alert, cooperative, no distress, appears stated age   HEENT:   Normocephalic, atraumatic, anicteric. Neck:  Supple, symmetrical   Lungs:   Clear to auscultation bilaterally; no rales, rhonchi or wheezing; respirations unlabored    Heart[de-identified]   Regular rate and rhythm; no murmur, rub, or gallop. Abdomen:   Soft, non-tender, non-distended; normal bowel sounds; no masses, no organomegaly    Genitalia:   Deferred    Rectal:   Deferred    Extremities:  No cyanosis, clubbing or edema    Pulses:  Not assessed   Skin:  No jaundice, rashes, or lesions    Lymph nodes:  Not assessed       Lab Results:   No visits with results within 1 Day(s) from this visit.    Latest known visit with results is:   Admission on 11/07/2017, Discharged on 11/07/2017   Component Date Value   • Case Report 11/07/2017                      Value:Surgical Pathology Report                         Case: [id number]                                   Authorizing Provider:  Pete Ferreira Sreedhar Merino MD               Collected:           11/07/2017 0831              Ordering Location:     [address] Received:            11/07/2017 [facility]                                     Operating Room                                                               Pathologist:           Mayra Caldwell MD                                                         Specimen:    Lipoma of cord, lipoma of right cord                                                      • Final Diagnosis 11/07/2017                      Value: This result contains rich text formatting which cannot be displayed here. • Additional Information 11/07/2017                      Value: This result contains rich text formatting which cannot be displayed here. • Gross Description 11/07/2017                      Value: This result contains rich text formatting which cannot be displayed here. Radiology Results:   No results found. Shelly Mayes PA-C

## 2023-10-10 RX ORDER — SODIUM CHLORIDE, SODIUM LACTATE, POTASSIUM CHLORIDE, CALCIUM CHLORIDE 600; 310; 30; 20 MG/100ML; MG/100ML; MG/100ML; MG/100ML
75 INJECTION, SOLUTION INTRAVENOUS CONTINUOUS
Status: CANCELLED | OUTPATIENT
Start: 2023-10-10

## 2023-10-11 ENCOUNTER — HOSPITAL ENCOUNTER (OUTPATIENT)
Dept: GASTROENTEROLOGY | Facility: AMBULARY SURGERY CENTER | Age: 58
Setting detail: OUTPATIENT SURGERY
Discharge: HOME | End: 2023-10-11
Attending: INTERNAL MEDICINE
Payer: COMMERCIAL

## 2023-10-11 ENCOUNTER — ANESTHESIA EVENT (OUTPATIENT)
Dept: GASTROENTEROLOGY | Facility: AMBULARY SURGERY CENTER | Age: 58
End: 2023-10-11

## 2023-10-11 ENCOUNTER — ANESTHESIA (OUTPATIENT)
Dept: GASTROENTEROLOGY | Facility: AMBULARY SURGERY CENTER | Age: 58
End: 2023-10-11

## 2023-10-11 VITALS
DIASTOLIC BLOOD PRESSURE: 59 MMHG | OXYGEN SATURATION: 94 % | SYSTOLIC BLOOD PRESSURE: 113 MMHG | RESPIRATION RATE: 16 BRPM | WEIGHT: 234 LBS | BODY MASS INDEX: 35.06 KG/M2 | HEART RATE: 74 BPM | TEMPERATURE: 97.4 F

## 2023-10-11 DIAGNOSIS — R10.13 EPIGASTRIC PAIN: ICD-10-CM

## 2023-10-11 PROBLEM — J45.909 MODERATE ASTHMA: Status: ACTIVE | Noted: 2023-10-11

## 2023-10-11 PROBLEM — F17.200 SMOKING: Status: ACTIVE | Noted: 2023-10-11

## 2023-10-11 PROBLEM — F32.A DEPRESSION: Status: ACTIVE | Noted: 2023-10-11

## 2023-10-11 PROBLEM — I10 HTN (HYPERTENSION): Status: ACTIVE | Noted: 2023-10-11

## 2023-10-11 PROBLEM — E05.90 HYPERTHYROIDISM: Status: ACTIVE | Noted: 2023-10-11

## 2023-10-11 PROBLEM — R47.01 APHASIA: Status: ACTIVE | Noted: 2023-10-11

## 2023-10-11 PROBLEM — Z92.84 HISTORY OF UNINTENDED AWARENESS UNDER GENERAL ANESTHESIA: Status: ACTIVE | Noted: 2023-10-11

## 2023-10-11 PROBLEM — R73.03 PREDIABETES: Status: ACTIVE | Noted: 2023-10-11

## 2023-10-11 PROBLEM — E78.5 HYPERLIPIDEMIA: Status: ACTIVE | Noted: 2023-10-11

## 2023-10-11 PROBLEM — E11.9 DIABETES MELLITUS, TYPE 2 (HCC): Status: ACTIVE | Noted: 2023-10-11

## 2023-10-11 RX ORDER — SODIUM CHLORIDE, SODIUM LACTATE, POTASSIUM CHLORIDE, CALCIUM CHLORIDE 600; 310; 30; 20 MG/100ML; MG/100ML; MG/100ML; MG/100ML
75 INJECTION, SOLUTION INTRAVENOUS CONTINUOUS
Status: DISCONTINUED | OUTPATIENT
Start: 2023-10-11 | End: 2023-10-15 | Stop reason: HOSPADM

## 2023-10-11 RX ORDER — SODIUM CHLORIDE, SODIUM LACTATE, POTASSIUM CHLORIDE, CALCIUM CHLORIDE 600; 310; 30; 20 MG/100ML; MG/100ML; MG/100ML; MG/100ML
INJECTION, SOLUTION INTRAVENOUS CONTINUOUS PRN
Status: DISCONTINUED | OUTPATIENT
Start: 2023-10-11 | End: 2023-10-11

## 2023-10-11 RX ORDER — PROPOFOL 10 MG/ML
INJECTION, EMULSION INTRAVENOUS AS NEEDED
Status: DISCONTINUED | OUTPATIENT
Start: 2023-10-11 | End: 2023-10-11

## 2023-10-11 RX ORDER — LIDOCAINE HYDROCHLORIDE 10 MG/ML
INJECTION, SOLUTION EPIDURAL; INFILTRATION; INTRACAUDAL; PERINEURAL AS NEEDED
Status: DISCONTINUED | OUTPATIENT
Start: 2023-10-11 | End: 2023-10-11

## 2023-10-11 RX ADMIN — PROPOFOL 50 MG: 10 INJECTION, EMULSION INTRAVENOUS at 08:12

## 2023-10-11 RX ADMIN — SODIUM CHLORIDE, SODIUM LACTATE, POTASSIUM CHLORIDE, AND CALCIUM CHLORIDE: .6; .31; .03; .02 INJECTION, SOLUTION INTRAVENOUS at 07:55

## 2023-10-11 RX ADMIN — LIDOCAINE HYDROCHLORIDE 50 MG: 10 INJECTION, SOLUTION EPIDURAL; INFILTRATION; INTRACAUDAL; PERINEURAL at 08:11

## 2023-10-11 RX ADMIN — SODIUM CHLORIDE, SODIUM LACTATE, POTASSIUM CHLORIDE, AND CALCIUM CHLORIDE 75 ML/HR: .6; .31; .03; .02 INJECTION, SOLUTION INTRAVENOUS at 07:56

## 2023-10-11 RX ADMIN — PROPOFOL 100 MG: 10 INJECTION, EMULSION INTRAVENOUS at 08:11

## 2023-10-11 NOTE — H&P
History and Physical - [facility]  Levon Monroe 65 y.o. male MRN: [id number]    HPI: Levon Monroe is a 65y.o. year old male who presents with epigastric pain.       Review of Systems    Historical Information   Past Medical History:   Diagnosis Date    Anxiety     Asthma     COPD (chronic obstructive pulmonary disease) ([address] [facility])     Depression     Hyperlipidemia     Hypertension     Obesity      Past Surgical History:   Procedure Laterality Date    DENTAL SURGERY      HERNIA REPAIR      [place] REPAIR FIRST ABDOMINAL WALL HERNIA N/A 9/27/2017    Procedure: REPAIR HERNIA VENTRAL WITH MESH;  Surgeon: Max Laguna MD;  Location: [facility];  Service: General    TX RPR 1ST INGUN HRNA AGE 5 YRS/> REDUCIBLE Left 10/31/2017    Procedure: INGUINAL HERNIA REPAIR WITH MESH;  Surgeon: Max Laguna MD;  Location: [facility];  Service: General    TX RPR 1ST INGUN HRNA AGE 5 YRS/> REDUCIBLE Right 11/7/2017    Procedure: INGUINAL HERNIA REPAIR WITH MESH;  Surgeon: aMx Laguna MD;  Location: [facility];  Service: General    TRACHEOSTOMY      with a trauma 1989     Social History   Social History     Substance and Sexual Activity   Alcohol Use Not Currently    Comment: quit 7/2016 (stopped 33 days ago per 10/5/23 gi apt)     Social History     Substance and Sexual Activity   Drug Use Yes    Types: Marijuana    Comment: " used everything x 10 years none since 1989     Social History     Tobacco Use   Smoking Status Every Day    Packs/day: 0.50    Years: 30.00    Total pack years: 15.00    Types: Cigarettes   Smokeless Tobacco Never     Family History   Problem Relation Age of Onset    Colon cancer Family        Meds/Allergies     (Not in a hospital admission)      Allergies   Allergen Reactions    Barium Sulfate     Lipitor [Atorvastatin]     Penicillins Hives    Clindamycin Rash    Percocet [Oxycodone-Acetaminophen] Rash    Tramadol Rash    Vicodin [Hydrocodone-Acetaminophen] Rash       Objective     /58   Pulse 65 Temp (!) 97.4 °F (36.3 °C) (Tympanic)   Resp 18   Wt 106 kg (234 lb)   SpO2 97%   BMI 35.06 kg/m²       PHYSICAL EXAM    Gen: NAD  CV: RRR  CHEST: Clear  ABD: soft, NT/ND  EXT: no edema  Neuro: AAO      ASSESSMENT/PLAN:  This is a 65y.o. year old male here for epigastric pain.     PLAN:   Procedure: EGD

## 2023-10-11 NOTE — ANESTHESIA PREPROCEDURE EVALUATION
Procedure:  EGD    Relevant Problems   ANESTHESIA   (+) History of unintended awareness under general anesthesia      CARDIO   (+) HTN (hypertension)   (+) Hyperlipidemia      ENDO   (+) Hyperthyroidism      GI/HEPATIC   (+) Fatty liver      NEURO/PSYCH   (+) Aphasia   (+) Depression      PULMONARY   (+) Moderate asthma   (+) Smoking      Other   (+) Prediabetes        Physical Exam    Airway    Mallampati score: II         Dental    upper dentures    Cardiovascular  Rhythm: regular, Rate: normal    Pulmonary   Breath sounds clear to auscultation    Other Findings        Anesthesia Plan  ASA Score- 2     Anesthesia Type- IV sedation with anesthesia with ASA Monitors. Additional Monitors:     Airway Plan:            Plan Factors-    Chart reviewed. Patient is a current smoker. Patient instructed to abstain from smoking on day of procedure. Patient smoked on day of surgery. Induction- intravenous. Postoperative Plan-     Informed Consent- Anesthetic plan and risks discussed with patient. I personally reviewed this patient with the CRNA. Discussed and agreed on the Anesthesia Plan with the CRNA. Merrick Quezada

## 2023-10-11 NOTE — ANESTHESIA POSTPROCEDURE EVALUATION
Post-Op Assessment Note    CV Status:  Stable  Pain Score: 0    Pain management: adequate     Mental Status:  Sleepy   Hydration Status:  Stable   PONV Controlled:  None   Airway Patency:  Patent      Post Op Vitals Reviewed: Yes      Staff: Anesthesiologist, CRNA         No notable events documented.     BP   92/53   Temp     Pulse  70   Resp   14   SpO2   98

## 2023-10-19 ENCOUNTER — TELEPHONE (OUTPATIENT)
Age: 58
End: 2023-10-19

## 2023-10-19 DIAGNOSIS — R10.10 UPPER ABDOMINAL PAIN: Primary | ICD-10-CM

## 2023-10-19 NOTE — TELEPHONE ENCOUNTER
Patient calling in for biopsy results from EGD. I reviewed EGD notes and no biopsies done due to incomplete exam. Patient is agreeable to labs and CT abd pelvis. Please order for patient.

## 2023-10-20 ENCOUNTER — TELEPHONE (OUTPATIENT)
Age: 58
End: 2023-10-20

## 2023-10-20 NOTE — TELEPHONE ENCOUNTER
Patient contacted office for address of scheduled MRI. Address given, patient expressed understanding.

## 2023-10-23 NOTE — TELEPHONE ENCOUNTER
Called Pt to schedule EGD for after CT and blood work. No answer and no mailbox. Will call in a few days.

## 2023-10-27 ENCOUNTER — TELEPHONE (OUTPATIENT)
Dept: GASTROENTEROLOGY | Facility: AMBULARY SURGERY CENTER | Age: 58
End: 2023-10-27

## 2023-10-27 NOTE — TELEPHONE ENCOUNTER
Per Miranda Greco PA-C a peer to peer was done with patient's insurance company and CT has been approved by a Dr. Zhen Camara via peer to peer  Approval number is [id number] . Tried calling patient however a message could not be left because the voicemail box has not been setup.

## 2023-10-31 ENCOUNTER — TELEPHONE (OUTPATIENT)
Dept: GASTROENTEROLOGY | Facility: AMBULARY SURGERY CENTER | Age: 58
End: 2023-10-31

## 2023-11-02 ENCOUNTER — TELEPHONE (OUTPATIENT)
Dept: GASTROENTEROLOGY | Facility: AMBULARY SURGERY CENTER | Age: 58
End: 2023-11-02

## 2023-11-02 NOTE — TELEPHONE ENCOUNTER
Tried calling patient again to inform him of the CT approval. Unfortunately his phone has calling restrictions which didn't allow my call to go through. Letter has been sent to patient.

## 2023-11-02 NOTE — LETTER
November 2, 2023    Ayanna Tanner  [address] [address]      Dear Mr. Nicholes Brunner:      Our office has tried to reach you regarding the approval from your insurance company regarding CT scan. Please contact central scheduling at [phone] to schedule.          Sincerely,      [facility]

## 2023-11-17 ENCOUNTER — HOSPITAL ENCOUNTER (OUTPATIENT)
Dept: RADIOLOGY | Facility: HOSPITAL | Age: 58
Discharge: HOME | End: 2023-11-17
Attending: INTERNAL MEDICINE

## 2023-11-17 DIAGNOSIS — R10.10 UPPER ABDOMINAL PAIN: ICD-10-CM

## 2024-02-02 ENCOUNTER — HOSPITAL ENCOUNTER (OUTPATIENT)
Dept: RADIOLOGY | Facility: HOSPITAL | Age: 59
Discharge: HOME | End: 2024-02-02
Attending: INTERNAL MEDICINE
Payer: COMMERCIAL

## 2024-02-02 DIAGNOSIS — R10.10 UPPER ABDOMINAL PAIN: ICD-10-CM

## 2024-02-02 PROCEDURE — G1004 CDSM NDSC: HCPCS

## 2024-02-02 PROCEDURE — 74177 CT ABD & PELVIS W/CONTRAST: CPT

## 2024-02-02 RX ADMIN — IOHEXOL 100 ML: 350 INJECTION, SOLUTION INTRAVENOUS at 12:49

## 2024-02-12 ENCOUNTER — TELEPHONE (OUTPATIENT)
Age: 59
End: 2024-02-12

## 2024-02-12 DIAGNOSIS — R93.2 ABNORMAL FINDING ON IMAGING OF LIVER: Primary | ICD-10-CM

## 2024-02-12 DIAGNOSIS — N32.89 BLADDER WALL THICKENING: ICD-10-CM

## 2024-07-16 ENCOUNTER — TELEPHONE (OUTPATIENT)
Age: 59
End: 2024-07-16

## 2024-07-16 NOTE — TELEPHONE ENCOUNTER
New Patient    What is the reason for the patient’s appointment? NP- Bladder wall thickening. Patient missed his appointment in July. I gave him the 1st available 10/3, could case be triaged to make sure patient can wait this long to be seen?    CB: [phone]    What office location does the patient prefer? [place]    Does patient have Imaging/Lab Results:  In Epic     Have patient records been requested?:  If No, are the records showing in Epic:   In Epic     INSURANCE:   Do we accept the patient's insurance or is the patient Self-Pay?  [facility]    HISTORY:   Has the patient had any previous Urologist(s)? No    Was the patient seen in the ED? No    Has the patient had any outside testing done? No    Does the patient have a personal history of cancer? No

## 2024-07-17 NOTE — TELEPHONE ENCOUNTER
Call placed to patient and spoke with him. Offered patient sooner appointment for tomorrow with Dr. Valdes. Pt could not make this appointment. Offered next available on 7- at 2:15pm and patient confirmed this appointment date and time.

## 2024-07-25 ENCOUNTER — OFFICE VISIT (OUTPATIENT)
Dept: UROLOGY | Facility: CLINIC | Age: 59
End: 2024-07-25
Payer: COMMERCIAL

## 2024-07-25 VITALS
OXYGEN SATURATION: 97 % | HEART RATE: 60 BPM | HEIGHT: 69 IN | SYSTOLIC BLOOD PRESSURE: 90 MMHG | DIASTOLIC BLOOD PRESSURE: 60 MMHG | WEIGHT: 227 LBS | BODY MASS INDEX: 33.62 KG/M2

## 2024-07-25 DIAGNOSIS — R31.0 GROSS HEMATURIA: Primary | ICD-10-CM

## 2024-07-25 PROCEDURE — 52000 CYSTOURETHROSCOPY: CPT | Performed by: UROLOGY

## 2024-07-25 PROCEDURE — 99024 POSTOP FOLLOW-UP VISIT: CPT | Performed by: UROLOGY

## 2024-07-25 RX ORDER — LEVOFLOXACIN 5 MG/ML
500 INJECTION, SOLUTION INTRAVENOUS ONCE
OUTPATIENT
Start: 2024-07-25 | End: 2024-07-25

## 2024-07-25 NOTE — PROGRESS NOTES
Cystoscopy     Date/Time  7/25/2024 2:15 PM     Performed by  Sunny Valdes MD   Authorized by  Sunny Valdes MD     Universal Protocol:  Consent: Written consent obtained.  Risks and benefits: risks, benefits and alternatives were discussed  Consent given by: patient  Patient identity confirmed: verbally with patient      Procedure Details:  Procedure type: cystoscopy    Patient tolerance: Patient tolerated the procedure well with no immediate complications    Additional Procedure Details: CT Scan February 2024  URINARY BLADDER: Question mild asymmetric thickening of the right anterolateral bladder wall measuring up to about 7 mm (2/144). Possibility of a bladder mass cannot be excluded. There are small calcifications seen along the lateral bladder walls  bilaterally. No dependent bladder calculi.    Indication: 1 year gross hematuria.  Abnormal thickening on CT imaging.    Plan: Cystoscopy with biopsy of the bladder.  Lesions are too extensive to do full fulguration of everything.  Would need to follow-up on the pathology before doing so.  Might require 2 procedures.  Patient is aware.  Need to get medical clearance due to his cirrhosis.

## 2024-07-25 NOTE — PATIENT INSTRUCTIONS
We will have to arrange for an operation where we look in the bladder with you asleep to do a scraping to see what the abnormal tissue is that we saw in the office.

## 2024-07-26 ENCOUNTER — PREP FOR PROCEDURE (OUTPATIENT)
Dept: UROLOGY | Facility: CLINIC | Age: 59
End: 2024-07-26

## 2024-07-26 ENCOUNTER — TELEPHONE (OUTPATIENT)
Dept: UROLOGY | Facility: CLINIC | Age: 59
End: 2024-07-26

## 2024-07-26 NOTE — TELEPHONE ENCOUNTER
Called patient l/m to call back and confirm the date of 8/21/24 at [facility] with

## 2024-07-29 ENCOUNTER — PREP FOR PROCEDURE (OUTPATIENT)
Dept: UROLOGY | Facility: CLINIC | Age: 59
End: 2024-07-29

## 2024-07-29 DIAGNOSIS — Z01.812 PRE-OPERATIVE LABORATORY EXAMINATION: ICD-10-CM

## 2024-07-29 DIAGNOSIS — R39.89 SUSPECTED UTI: Primary | ICD-10-CM

## 2024-07-29 DIAGNOSIS — R31.0 GROSS HEMATURIA: ICD-10-CM

## 2024-07-29 DIAGNOSIS — Z01.818 OTHER SPECIFIED PRE-OPERATIVE EXAMINATION: ICD-10-CM

## 2024-08-13 ENCOUNTER — PATIENT OUTREACH (OUTPATIENT)
Dept: OTHER | Facility: CLINIC | Age: 59
End: 2024-08-13

## 2024-08-13 ENCOUNTER — ANESTHESIA EVENT (OUTPATIENT)
Dept: PERIOP | Facility: HOSPITAL | Age: 59
End: 2024-08-13
Payer: COMMERCIAL

## 2024-08-13 DIAGNOSIS — E66.9 OBESITY (BMI 30-39.9): Primary | ICD-10-CM

## 2024-08-13 NOTE — PRE-PROCEDURE INSTRUCTIONS
Pre-Surgery Instructions:   Medication Instructions    acetaminophen (TYLENOL) 325 mg tablet Uses PRN- OK to take day of surgery    albuterol (PROVENTIL HFA,VENTOLIN HFA) 90 mcg/act inhaler Uses PRN- OK to take day of surgery    ARIPiprazole (ABILIFY) 5 mg tablet Take day of surgery.    diazepam (VALIUM) 5 mg tablet Uses PRN- OK to take day of surgery    escitalopram (LEXAPRO) 10 mg tablet Take night before surgery    levothyroxine 100 mcg tablet Take day of surgery.    lisinopril (ZESTRIL) 20 mg tablet Hold day of surgery.    pravastatin (PRAVACHOL) 10 mg tablet Take night before surgery    QUEtiapine (SEROquel) 100 mg tablet Take night before surgery      Medication instructions for day surgery reviewed. Please use only a sip of water to take your instructed medications. Avoid all over the counter vitamins, supplements and NSAIDS for one week prior to surgery per anesthesia guidelines. Tylenol is ok to take as needed.     You will receive a call one business day prior to surgery with an arrival time and hospital directions. If your surgery is scheduled on a Monday, the hospital will be calling you on the Friday prior to your surgery. If you have not heard from anyone by 8pm, please call the hospital supervisor through the hospital  at [phone]. ([place] [phone] or [place] [phone]).    Do not eat or drink anything after midnight the night before your surgery, including candy, mints, lifesavers, or chewing gum. Do not drink alcohol 24hrs before your surgery. Try not to smoke at least 24hrs before your surgery.       Follow the pre surgery showering instructions as listed in the “My Surgical Experience Booklet” or otherwise provided by your surgeon's office. Do not use a blade to shave the surgical area 1 week before surgery. It is okay to use a clean electric clippers up to 24 hours before surgery. Do not apply any lotions, creams, including makeup, cologne, deodorant, or perfumes after  showering on the day of your surgery. Do not use dry shampoo, hair spray, hair gel, or any type of hair products.     No contact lenses, eye make-up, or artificial eyelashes. Remove nail polish, including gel polish, and any artificial, gel, or acrylic nails if possible. Remove all jewelry including rings and body piercing jewelry.     Wear causal clothing that is easy to take on and off. Consider your type of surgery.    Keep any valuables, jewelry, piercings at home. Please bring any specially ordered equipment (sling, braces) if indicated.    Arrange for a responsible person to drive you to and from the hospital on the day of your surgery. Please confirm the visitor policy for the day of your procedure when you receive your phone call with an arrival time.     Call the surgeon's office with any new illnesses, exposures, or additional questions prior to surgery.    Please reference your “My Surgical Experience Booklet” for additional information to prepare for your upcoming surgery.

## 2024-08-21 ENCOUNTER — HOSPITAL ENCOUNTER (OUTPATIENT)
Facility: HOSPITAL | Age: 59
Setting detail: OUTPATIENT SURGERY
Discharge: HOME | End: 2024-08-21
Attending: UROLOGY | Admitting: UROLOGY
Payer: COMMERCIAL

## 2024-08-21 ENCOUNTER — TELEPHONE (OUTPATIENT)
Dept: UROLOGY | Facility: CLINIC | Age: 59
End: 2024-08-21

## 2024-08-21 ENCOUNTER — ANESTHESIA (OUTPATIENT)
Dept: PERIOP | Facility: HOSPITAL | Age: 59
End: 2024-08-21
Payer: COMMERCIAL

## 2024-08-21 VITALS
HEIGHT: 69 IN | OXYGEN SATURATION: 96 % | DIASTOLIC BLOOD PRESSURE: 79 MMHG | WEIGHT: 231.48 LBS | RESPIRATION RATE: 20 BRPM | TEMPERATURE: 97.1 F | SYSTOLIC BLOOD PRESSURE: 168 MMHG | HEART RATE: 76 BPM | BODY MASS INDEX: 34.29 KG/M2

## 2024-08-21 DIAGNOSIS — T65.291A TOXIC EFFECT OF TOBACCO AND NICOTINE, ACCIDENTAL OR UNINTENTIONAL, INITIAL ENCOUNTER: Primary | ICD-10-CM

## 2024-08-21 DIAGNOSIS — R31.0 GROSS HEMATURIA: ICD-10-CM

## 2024-08-21 PROBLEM — R93.41 ABNORMAL CT SCAN, BLADDER: Status: ACTIVE | Noted: 2024-08-21

## 2024-08-21 PROBLEM — R39.9 ABNORMAL CYSTOSCOPY: Status: ACTIVE | Noted: 2024-08-21

## 2024-08-21 LAB
ANION GAP SERPL CALCULATED.3IONS-SCNC: 7 MMOL/L (ref 4–13)
BUN SERPL-MCNC: 11 MG/DL (ref 5–25)
CALCIUM SERPL-MCNC: 8.8 MG/DL (ref 8.4–10.2)
CHLORIDE SERPL-SCNC: 106 MMOL/L (ref 96–108)
CO2 SERPL-SCNC: 23 MMOL/L (ref 21–32)
CREAT SERPL-MCNC: 0.87 MG/DL (ref 0.6–1.3)
GFR SERPL CREATININE-BSD FRML MDRD: 95 ML/MIN/1.73SQ M
GLUCOSE P FAST SERPL-MCNC: 90 MG/DL (ref 65–99)
GLUCOSE SERPL-MCNC: 90 MG/DL (ref 65–140)
POTASSIUM SERPL-SCNC: 4 MMOL/L (ref 3.5–5.3)
SODIUM SERPL-SCNC: 136 MMOL/L (ref 135–147)

## 2024-08-21 PROCEDURE — 52204 CYSTOSCOPY W/BIOPSY(S): CPT | Performed by: UROLOGY

## 2024-08-21 PROCEDURE — 80048 BASIC METABOLIC PNL TOTAL CA: CPT | Performed by: UROLOGY

## 2024-08-21 PROCEDURE — NC001 PR NO CHARGE: Performed by: UROLOGY

## 2024-08-21 PROCEDURE — 88305 TISSUE EXAM BY PATHOLOGIST: CPT | Performed by: PATHOLOGY

## 2024-08-21 RX ORDER — MIDAZOLAM HYDROCHLORIDE 2 MG/2ML
INJECTION, SOLUTION INTRAMUSCULAR; INTRAVENOUS AS NEEDED
Status: DISCONTINUED | OUTPATIENT
Start: 2024-08-21 | End: 2024-08-21

## 2024-08-21 RX ORDER — ONDANSETRON 2 MG/ML
4 INJECTION INTRAMUSCULAR; INTRAVENOUS ONCE AS NEEDED
Status: DISCONTINUED | OUTPATIENT
Start: 2024-08-21 | End: 2024-08-21 | Stop reason: HOSPADM

## 2024-08-21 RX ORDER — FENTANYL CITRATE/PF 50 MCG/ML
50 SYRINGE (ML) INJECTION
Status: DISCONTINUED | OUTPATIENT
Start: 2024-08-21 | End: 2024-08-21 | Stop reason: HOSPADM

## 2024-08-21 RX ORDER — DEXAMETHASONE SODIUM PHOSPHATE 4 MG/ML
INJECTION, SOLUTION INTRA-ARTICULAR; INTRALESIONAL; INTRAMUSCULAR; INTRAVENOUS; SOFT TISSUE AS NEEDED
Status: DISCONTINUED | OUTPATIENT
Start: 2024-08-21 | End: 2024-08-21

## 2024-08-21 RX ORDER — SUCCINYLCHOLINE/SOD CL,ISO/PF 100 MG/5ML
SYRINGE (ML) INTRAVENOUS AS NEEDED
Status: DISCONTINUED | OUTPATIENT
Start: 2024-08-21 | End: 2024-08-21

## 2024-08-21 RX ORDER — FENTANYL CITRATE 50 UG/ML
INJECTION, SOLUTION INTRAMUSCULAR; INTRAVENOUS AS NEEDED
Status: DISCONTINUED | OUTPATIENT
Start: 2024-08-21 | End: 2024-08-21

## 2024-08-21 RX ORDER — SODIUM CHLORIDE, SODIUM LACTATE, POTASSIUM CHLORIDE, CALCIUM CHLORIDE 600; 310; 30; 20 MG/100ML; MG/100ML; MG/100ML; MG/100ML
INJECTION, SOLUTION INTRAVENOUS CONTINUOUS PRN
Status: DISCONTINUED | OUTPATIENT
Start: 2024-08-21 | End: 2024-08-21

## 2024-08-21 RX ORDER — PROPOFOL 10 MG/ML
INJECTION, EMULSION INTRAVENOUS AS NEEDED
Status: DISCONTINUED | OUTPATIENT
Start: 2024-08-21 | End: 2024-08-21

## 2024-08-21 RX ORDER — MAGNESIUM HYDROXIDE 1200 MG/15ML
LIQUID ORAL AS NEEDED
Status: DISCONTINUED | OUTPATIENT
Start: 2024-08-21 | End: 2024-08-21 | Stop reason: HOSPADM

## 2024-08-21 RX ORDER — ONDANSETRON 2 MG/ML
INJECTION INTRAMUSCULAR; INTRAVENOUS AS NEEDED
Status: DISCONTINUED | OUTPATIENT
Start: 2024-08-21 | End: 2024-08-21

## 2024-08-21 RX ORDER — ROCURONIUM BROMIDE 10 MG/ML
INJECTION, SOLUTION INTRAVENOUS AS NEEDED
Status: DISCONTINUED | OUTPATIENT
Start: 2024-08-21 | End: 2024-08-21

## 2024-08-21 RX ORDER — GLYCINE 1.5 G/100ML
SOLUTION IRRIGATION AS NEEDED
Status: DISCONTINUED | OUTPATIENT
Start: 2024-08-21 | End: 2024-08-21 | Stop reason: HOSPADM

## 2024-08-21 RX ORDER — LIDOCAINE HYDROCHLORIDE 10 MG/ML
INJECTION, SOLUTION EPIDURAL; INFILTRATION; INTRACAUDAL; PERINEURAL AS NEEDED
Status: DISCONTINUED | OUTPATIENT
Start: 2024-08-21 | End: 2024-08-21

## 2024-08-21 RX ORDER — LEVOFLOXACIN 5 MG/ML
500 INJECTION, SOLUTION INTRAVENOUS ONCE
Status: COMPLETED | OUTPATIENT
Start: 2024-08-21 | End: 2024-08-21

## 2024-08-21 RX ADMIN — LIDOCAINE HYDROCHLORIDE 50 MG: 10 INJECTION, SOLUTION EPIDURAL; INFILTRATION; INTRACAUDAL at 08:45

## 2024-08-21 RX ADMIN — SODIUM CHLORIDE, SODIUM LACTATE, POTASSIUM CHLORIDE, AND CALCIUM CHLORIDE: .6; .31; .03; .02 INJECTION, SOLUTION INTRAVENOUS at 08:43

## 2024-08-21 RX ADMIN — PROPOFOL 200 MG: 10 INJECTION, EMULSION INTRAVENOUS at 08:45

## 2024-08-21 RX ADMIN — DEXAMETHASONE SODIUM PHOSPHATE 5 MG: 4 INJECTION, SOLUTION INTRA-ARTICULAR; INTRALESIONAL; INTRAMUSCULAR; INTRAVENOUS; SOFT TISSUE at 08:54

## 2024-08-21 RX ADMIN — ONDANSETRON 4 MG: 2 INJECTION INTRAMUSCULAR; INTRAVENOUS at 08:54

## 2024-08-21 RX ADMIN — ROCURONIUM BROMIDE 20 MG: 10 INJECTION, SOLUTION INTRAVENOUS at 09:14

## 2024-08-21 RX ADMIN — PROPOFOL 70 MG: 10 INJECTION, EMULSION INTRAVENOUS at 08:46

## 2024-08-21 RX ADMIN — FENTANYL CITRATE 50 MCG: 50 INJECTION INTRAMUSCULAR; INTRAVENOUS at 08:51

## 2024-08-21 RX ADMIN — FENTANYL CITRATE 50 MCG: 50 INJECTION INTRAMUSCULAR; INTRAVENOUS at 09:54

## 2024-08-21 RX ADMIN — MIDAZOLAM 2 MG: 1 INJECTION INTRAMUSCULAR; INTRAVENOUS at 08:43

## 2024-08-21 RX ADMIN — PROPOFOL 100 MG: 10 INJECTION, EMULSION INTRAVENOUS at 09:03

## 2024-08-21 RX ADMIN — Medication 100 MG: at 09:03

## 2024-08-21 RX ADMIN — LEVOFLOXACIN 500 MG: 500 INJECTION, SOLUTION INTRAVENOUS at 08:28

## 2024-08-21 RX ADMIN — FENTANYL CITRATE 50 MCG: 50 INJECTION INTRAMUSCULAR; INTRAVENOUS at 08:48

## 2024-08-21 RX ADMIN — PROPOFOL 30 MG: 10 INJECTION, EMULSION INTRAVENOUS at 08:53

## 2024-08-21 RX ADMIN — SUGAMMADEX 200 MG: 100 INJECTION, SOLUTION INTRAVENOUS at 09:19

## 2024-08-21 NOTE — TELEPHONE ENCOUNTER
Pt remains inpatient. Continue to monitor for discharge.     Plan: Follow-up regarding the biopsy in the next week or so.  Might require more extensive fulguration.    Pt has appt for 2-week post op on 9/10/24 at 11:30 am.

## 2024-08-21 NOTE — H&P
History & Physical - [facility]  Jaxon Lou 58 y.o. male MRN: [id number]  Unit/Bed#: OR POOL Encounter: [id number]    ASSESSMENT  Abnormal cystoscopy    PLAN:  Bladder biopsy.    HISTORY OF PRESENT ILLNESS:  58 M bladder wall thickening on CT.  Cysto 7/25/24 showed red patch, flat.  Not papillary. Aware will biopsy first and depending on pathology might have to do more extensive fulguration and GC instillation.    PAST MEDICAL HISTORY:  Past Medical History:   Diagnosis Date    Anxiety     Asthma     COPD (chronic obstructive pulmonary disease) (HCC)     Depression     History of transfusion     Hyperlipidemia     Hypertension     Liver disease     Cirrhosis-new dx 2/24    Obesity        PAST SURGICAL HISTORY:  Past Surgical History:   Procedure Laterality Date    DENTAL SURGERY      FOOT SURGERY Bilateral     toe nails removed on both feet    HERNIA REPAIR      MT REPAIR FIRST ABDOMINAL WALL HERNIA N/A 09/27/2017    Procedure: REPAIR HERNIA VENTRAL WITH MESH;  Surgeon: Adolfo Dale MD;  Location: [facility];  Service: General    MT RPR 1ST INGUN HRNA AGE 5 YRS/> REDUCIBLE Left 10/31/2017    Procedure: INGUINAL HERNIA REPAIR WITH MESH;  Surgeon: Adolfo Dale MD;  Location: [facility];  Service: General    MT RPR 1ST INGUN HRNA AGE 5 YRS/> REDUCIBLE Right 11/07/2017    Procedure: INGUINAL HERNIA REPAIR WITH MESH;  Surgeon: Adolfo Dale MD;  Location: [facility];  Service: General    TRACHEOSTOMY      with a trauma 1989       ALLERGIES:  Allergies   Allergen Reactions    Penicillins Hives    Barium Sulfate Rash    Clindamycin Rash    Percocet [Oxycodone-Acetaminophen] Rash    Tramadol Rash    Vicodin [Hydrocodone-Acetaminophen] Rash       SOCIAL HISTORY:  Social History     Substance and Sexual Activity   Alcohol Use Not Currently    Alcohol/week: 12.0 standard drinks of alcohol    Types: 12 Standard drinks or equivalent per week    Comment: twice weely-malt liquor, twisted teas     Social History     Substance and  Sexual Activity   Drug Use Yes    Types: Marijuana    Comment: medical marijuana     Social History     Tobacco Use   Smoking Status Every Day    Current packs/day: 0.50    Average packs/day: 0.5 packs/day for 30.0 years (15.0 ttl pk-yrs)    Types: Cigarettes   Smokeless Tobacco Never   Tobacco Comments    Smokes 1/2 pack daily       FAMILY HISTORY:  Family History   Problem Relation Age of Onset    Colon cancer Family        MEDICATIONS:    Current Facility-Administered Medications:     levofloxacin (LEVAQUIN) IVPB (premix in dextrose) 500 mg 100 mL, 500 mg, Intravenous, Once, Sunny Valdes MD    Review of Systems   Constitutional:  Negative for chills and fever.   HENT:  Negative for ear pain and sore throat.    Eyes:  Negative for pain and visual disturbance.   Respiratory:  Negative for cough and shortness of breath.    Cardiovascular:  Negative for chest pain and palpitations.   Gastrointestinal:  Negative for abdominal pain and vomiting.   Genitourinary:  Negative for dysuria and hematuria.   Musculoskeletal:  Negative for arthralgias and back pain.   Skin:  Negative for color change and rash.   Neurological:  Negative for seizures and syncope.   All other systems reviewed and are negative.      PHYSICAL EXAM:  Physical Exam  Constitutional:       General: He is not in acute distress.     Appearance: He is well-developed. He is not diaphoretic.   HENT:      Head: Normocephalic and atraumatic.      Mouth/Throat:      Pharynx: No oropharyngeal exudate.   Eyes:      General: No scleral icterus.  Neck:      Vascular: No JVD.   Cardiovascular:      Rate and Rhythm: Normal rate and regular rhythm.      Heart sounds: No murmur heard.  Pulmonary:      Effort: Pulmonary effort is normal. No respiratory distress.      Breath sounds: Normal breath sounds. No wheezing.   Abdominal:      General: There is no distension.      Palpations: Abdomen is soft.      Tenderness: There is no abdominal tenderness. There is no  "rebound.   Musculoskeletal:      Cervical back: Normal range of motion and neck supple.   Skin:     General: Skin is warm and dry.   Neurological:      Mental Status: He is alert and oriented to person, place, and time.   Psychiatric:         Behavior: Behavior normal.         LAB RESULTS:  Lab Results   Component Value Date    WBC 8.0 10/30/2017    HGB 18.3 (H) 10/30/2017    HCT 52.1 (H) 10/30/2017    MCV 97 10/30/2017     10/30/2017     No results found for: \"SODIUM\", \"K\", \"CL\", \"CO2\", \"BUN\", \"CREATININE\", \"GLUC\", \"CALCIUM\"  No results found for: \"CALCIUM\", \"PHOS\"  No results found for: \"PSA\"    OTHER STUDIES:  CT 2/2/24  URINARY BLADDER: Question mild asymmetric thickening of the right anterolateral bladder wall measuring up to about 7 mm (2/144). Possibility of a bladder mass cannot be excluded. There are small calcifications seen along the lateral bladder walls bilaterally. No dependent bladder calculi.    Sunny Valdes MD  08/21/24    Portions of the record may have been created with voice recognition software.  Occasional wrong word or \"sound alike\" substitutions may have occurred due to the inherent limitations of voice recognition software.  Read the chart carefully and recognize, using context, where substitutions have occurred.  "

## 2024-08-21 NOTE — TELEPHONE ENCOUNTER
Post Op Note    Jaxon Lou is a 58 y.o. male s/p CYSTO, BLADDER BIOPSY, FULGERATION (Bladder) performed 8/21/24.  Jaxon Lou is a patient of Dr. Valdes and is seen at the [place] office.     How would you rate your pain on a scale from 1 to 10, 10 being the worst pain ever? 0  Have you had a fever? No  Have your bowel movements been regular? No  Do you have any difficulty urinating? Yes, some stinging/burning    Do you have any other questions or concerns that I can address at this time? Post op call placed to patient. Pt states he is good and appeared in great spirits. Pt says he is alittle tired and light headed but understands this is related to the anesthesia. He will continue to hydrate and take it easy today. Pt understands if no bowel movement to give a stool softener a try and if no success in 2-days to try Miralax. Pt was in agreement with this plan. Pt's only complaint was that he is having some stinging/burning when urinating. Pt was given an IV antibiotic at the hospital. Pt will give the office a call back if this worsens in the next day or two.     Plan: Needs pathology review in next 1-2 weeks. Pt has appt scheduled for 9/10/24 at 11:30 am. *Might require more extensive fulguration.

## 2024-08-21 NOTE — PERIOPERATIVE NURSING NOTE
Patient urinated 200 ml of pink urine. Patent reports some burning but tolerable. Discharge instructions reviewed verbally with the patient and his sister, written instructions provided as well. All questions answered. Patient will follow up with the provider in September; appointment already scheduled. Patient discharged from the unit in wheel chair with his belongings.

## 2024-08-21 NOTE — OP NOTE
OPERATIVE REPORT- Dr. Valdes  PATIENT NAME: Jaxon Lou    :  1965  MRN: [id number]  Pt Location: [facility] OR ROOM 04    SURGERY DATE: 2024    Surgeon: Sunny Valdes MD    Pre-op Diagnosis:  Hematuria  Abnormal cystoscopy  Abnormal CT findings, bladder wall thickening with calcification    Post-op Diagnosis:  Same    Procedure:  Cystoscopy with bladder wall biopsy and fulguration    Specimen(s):   ID Type Source Tests Collected by Time Destination   1 : posterior bladder wall bx Tissue Urinary Bladder TISSUE EXAM Sunny Valdes MD 2024 0915        Estimated Blood Loss: Minimal    Complications: None    Drains: None    Anesthesia type: Gen Endotracheal.  Did not tolerate LMA.  Too spastic.  Drinks regularly.    Indications for surgery: Hematuria with abnormal CT findings and cystoscopy.    Findings: Hemorrhagic bladder wall.  Calcifications on the bladder wall.  Bleeds easily.    Procedure and Technique:   After obtaining consent and identifying the patient, antibiotics were given as ordered and the patient was brought to the room.  All appropriate leads and monitors were placed and the patient was appropriately positioned on the table.  Anesthesia was administered and the patient was sterilely prepped and draped.  A timeout was performed where the patient name, , procedure, antibiotics, allergies, etc. were discussed.  All in the room were satisfied before the start of the operative procedure.  What follows are the operative findings and events.     Cystoscopy was undertaken with a 22 Wolof scope.  Area with calcification and deep redness was identified and a rigid biopsy forceps was used to take just a mucosal bite.  Patient is very high risk to be doing a muscle biopsy at this point.  There is no papillary lesion so the suspicion is that this is extensive CIS which should be sufficiently identified with a relatively superficial biopsy.  After the biopsy was obtained there was a delay  "while the hanging fluid was changed to glycine from saline.  The area had to be copiously irrigated.  The area of biopsy was fulgurated with a Bugbee.  Good hemostasis.  No attempt was made at a second area.  There appeared to be a good specimen obtained.  Did not want to risk bleeding again.  Especially given the patient's spasms and overall clinical condition.  He is cirrhotic also.  No active bleeding.  Picture was taken.  Bladder was drained.  The procedure was terminated and the patient was awakened without incident and transferred to the PACU in satisfactory condition.    Plan:  1.  Follow-up regarding the biopsy in the next week or so.  Might require more extensive fulguration.    SIGNATURE: Sunny Valdes MD  DATE: August 21, 2024  TIME: 9:33 AM    Portions of the record may have been created with voice recognition software.  Occasional wrong word or \"sound alike\" substitutions may have occurred due to the inherent limitations of voice recognition software.  Read the chart carefully and recognize, using context, where substitutions have occurred.  "

## 2024-08-21 NOTE — ANESTHESIA PREPROCEDURE EVALUATION
Procedure:  TRANSURETHRAL RESECTION OF BLADDER TUMOR (TURBT) (Bladder)    Relevant Problems   ANESTHESIA   (+) History of unintended awareness under general anesthesia      CARDIO   (+) HTN (hypertension)   (+) Hyperlipidemia      ENDO   (+) Hyperthyroidism      GI/HEPATIC   (+) Fatty liver      NEURO/PSYCH   (+) Aphasia   (+) Depression      PULMONARY   (+) Moderate asthma   (+) Smoking             Anesthesia Plan  ASA Score- 3     Anesthesia Type- IV sedation with anesthesia with ASA Monitors.         Additional Monitors:     Airway Plan:            Plan Factors-    Chart reviewed.                      Induction- intravenous.    Postoperative Plan-         Informed Consent- Anesthetic plan and risks discussed with patient.  I personally reviewed this patient with the CRNA. Discussed and agreed on the Anesthesia Plan with the CRNA..

## 2024-08-21 NOTE — ANESTHESIA POSTPROCEDURE EVALUATION
Post-Op Assessment Note    CV Status:  Stable  Pain Score: 0    Pain management: adequate    Multimodal analgesia used between 6 hours prior to anesthesia start to PACU discharge    Mental Status:  Alert   Hydration Status:  Stable   PONV Controlled:  None   Airway Patency:  Patent  Two or more mitigation strategies used for obstructive sleep apnea   Post Op Vitals Reviewed: Yes    No anethesia notable event occurred.    Staff: CRNA               /66 (08/21/24 0945)    Temp (!) 97.1 °F (36.2 °C) (08/21/24 0945)    Pulse 77 (08/21/24 0945)   Resp   18   SpO2 95

## 2024-08-21 NOTE — PERIOPERATIVE NURSING NOTE
Received patient from PACU post procedure. Patient is awake, alert and oriented x 4. Patient able to urinate with some burning reported. Urine slightly pink. Oral fluids offered and tolerated well. Sister at bedside.

## 2024-08-22 NOTE — TELEPHONE ENCOUNTER
Patient calling in stating he thought he was supposed to be receiving antibiotics after surgery yesterday. Please review and call patient.    CB: [phone]

## 2024-08-22 NOTE — TELEPHONE ENCOUNTER
Called and spoke with patient at this time. Reviewed no oral abx noted in Dr Valdes's notes/post op plan. He states he is still having dome dysuria. Reviewed this is normal post procedure. Reviewed if this continues greater than a week we may want urine testing to rule out infection. He inquired if Dr Valdes will call him with results. Reviewed this is typically an in person discussion for pathology results. This was scheduled for 9/10 at 1130. Office address provided. He verbalized understanding.

## 2024-08-27 PROCEDURE — 88305 TISSUE EXAM BY PATHOLOGIST: CPT | Performed by: PATHOLOGY

## 2024-08-29 NOTE — TELEPHONE ENCOUNTER
Patient of Dr. Valdes, last seen 8/21/2024 for a cystoscopy with bladder biopsy and fulgeration,called in stating he is experiencing 8/10 pain in his abdomen, dysuria, blood in urine, frequency, urgency, and incontinence. He states he is drinking 1 gallon of water a day. Denies fever, chills, or inability to urinate. He is currently at a cabin in [facility], I advised the patient go to the ER due to his severe pain. He verbalized understanding. Please advise.    Reason for Disposition   The patient has an unknown case of mild dysuria   The patient has severe uncontrolled pain    Answer Questions - Initial Assessment  When did your symptoms start: 6 months ago    Is burning with every void: Yes    Do you have any other urinary symptoms, urinary frequency, urgency, incontinence: frequency, urgency, incontinence    Does your urine stream spray (Specifically for males): dribbling at first and then a strong stream    Have you become unable to urinate: No    Have you seen blood in your urine: Yes    Do you have any abdominal pain or flank pain: Abdominal and flank pain    Do you have a fever of 101 or higher: No    Have you had a recent urologic procedure or surgery: CYSTO, BLADDER BIOPSY, FULGERATION (Bladder)    Protocols used: Dysuria

## 2024-09-10 ENCOUNTER — TELEPHONE (OUTPATIENT)
Dept: UROLOGY | Facility: CLINIC | Age: 59
End: 2024-09-10

## 2024-09-30 ENCOUNTER — NURSE TRIAGE (OUTPATIENT)
Age: 59
End: 2024-09-30

## 2024-09-30 NOTE — TELEPHONE ENCOUNTER
Patient called in to report to provider he is having ongoing dysuria and frequency since surgery on 08/21/24. Reports fluid intake of a gallon of water a day. Denies fever, chills, N/V. Reports constipation on and off, he has an appointment with gastroenterologist regarding this scheduled for 10/10/24.  States he has an infection and would like antibiotics. States Dr. Monge tested his urine a few days ago and he has results that it is an infection. I explained I do not see these results and to call Dr. Monge for antibiotics and to fax results to our main fax#: [phone] as we can not prescribe antibiotics without urine testing results.     Reason for Disposition  • Questions about, urinary health    Answer Assessment - Initial Assessment Questions  1. When did your symptoms start?   Ongoing since surgery see triage encounter from August, patient reports same symptoms.   2. Do you have a fever of 101 or higher? How did you take your temperature?   no  3. Have you had a recent urologic procedure, surgery, or any recent urine testing?   Yes    Protocols used: Urology-Dysuria-ADULT-[place]

## 2024-09-30 NOTE — TELEPHONE ENCOUNTER
Spoke with pt and relayed message below about urine testing. Pt states he will call and have it sent over to Dr. Valdes's office. Pt was also told he needs a follow up appt for his path results. Pt was confused and thought his follow up should be with a gastroenterologist. Pt was given follow up appt with Dr. Valdes on 10/15/24 at 2 pm for path results.     *Monitor for urine culture results.    Thiago Santana PA-C Physician Assistant Signed 1:36 PM     Copy     Yes lets plan to see results of urine culture.  I would also be okay with prescribing antibiotics but I need urine culture on hand.  Of note patient needs a follow-up appointment from his surgery with Dr. Valdes.  Pathology returned with bladder cancer.  Patient needs to have an in person discussion with Dr. Valdes about next steps

## 2024-09-30 NOTE — TELEPHONE ENCOUNTER
Yes lets plan to see results of urine culture.  I would also be okay with prescribing antibiotics but I need urine culture on hand.  Of note patient needs a follow-up appointment from his surgery with Dr. Valdes.  Pathology returned with bladder cancer.  Patient needs to have an in person discussion with Dr. Valdes about next steps

## 2024-10-02 NOTE — TELEPHONE ENCOUNTER
Attempted to call pt but pt does not have voicemail box set up yet.     Still no urine testing in chart. Continue to monitor and try pt again later.    Clerical - can you try Dr. Monge's office for pt's urine testing results? Thanks!

## 2024-10-03 ENCOUNTER — TELEPHONE (OUTPATIENT)
Dept: UROLOGY | Facility: CLINIC | Age: 59
End: 2024-10-03

## 2024-10-15 ENCOUNTER — TELEPHONE (OUTPATIENT)
Age: 59
End: 2024-10-15

## 2024-10-15 NOTE — TELEPHONE ENCOUNTER
Patient was calling to see able changing his appt to another day or if the doctor could call him. His appt is at 2 pm today. And his ride just cancelled.     Reached out to the office to see if the doctor could make the appt a virtual visit via a phone call (patient's phone can not do video) Office stated that the doctor was currently in with a patient. That they would ask when he gets out and give the patient a call back to let them know.

## 2024-10-23 ENCOUNTER — PROCEDURE VISIT (OUTPATIENT)
Dept: UROLOGY | Facility: CLINIC | Age: 59
End: 2024-10-23
Payer: COMMERCIAL

## 2024-10-23 VITALS
DIASTOLIC BLOOD PRESSURE: 78 MMHG | BODY MASS INDEX: 34.21 KG/M2 | HEART RATE: 77 BPM | WEIGHT: 231 LBS | SYSTOLIC BLOOD PRESSURE: 132 MMHG | HEIGHT: 69 IN

## 2024-10-23 DIAGNOSIS — C67.4 MALIGNANT NEOPLASM OF POSTERIOR WALL OF URINARY BLADDER (HCC): ICD-10-CM

## 2024-10-23 DIAGNOSIS — R31.0 GROSS HEMATURIA: Primary | ICD-10-CM

## 2024-10-23 PROCEDURE — 52000 CYSTOURETHROSCOPY: CPT | Performed by: UROLOGY

## 2024-10-23 RX ORDER — DIAZEPAM 10 MG/1
10 TABLET ORAL DAILY PRN
COMMUNITY
Start: 2024-09-03

## 2024-10-23 RX ORDER — ACAMPROSATE CALCIUM 333 MG/1
TABLET, DELAYED RELEASE ORAL
COMMUNITY
Start: 2024-09-24

## 2024-10-23 NOTE — PROGRESS NOTES
Cystoscopy     Date/Time  10/23/2024 3:00 PM     Performed by  Sunny Valdes MD   Authorized by  Sunny Valdes MD     Universal Protocol:  Consent: Written consent obtained.  Risks and benefits: risks, benefits and alternatives were discussed  Consent given by: patient  Patient understanding: patient states understanding of the procedure being performed  Patient consent: the patient's understanding of the procedure matches consent given  Procedure consent: procedure consent matches procedure scheduled  Patient identity confirmed: verbally with patient      Procedure Details:  Procedure type: cystoscopy    Patient tolerance: Patient tolerated the procedure well with no immediate complications    Additional Procedure Details: 59-year-old gentleman with history of bladder cancer, 8/21/2024  A. Urinary Bladder, posterior bladder wall bx:  - Non-invasive, high-grade papillary urothelial carcinoma.  - Muscularis propria is not identified.    Never returned to discuss repeat cystoscopy in the OR with deeper biopsy.  Here to evaluate and to discuss.    Findings:

## 2024-10-24 ENCOUNTER — TELEPHONE (OUTPATIENT)
Dept: UROLOGY | Facility: CLINIC | Age: 59
End: 2024-10-24

## 2024-10-24 ENCOUNTER — PREP FOR PROCEDURE (OUTPATIENT)
Dept: UROLOGY | Facility: CLINIC | Age: 59
End: 2024-10-24

## 2024-10-25 ENCOUNTER — PREP FOR PROCEDURE (OUTPATIENT)
Dept: UROLOGY | Facility: CLINIC | Age: 59
End: 2024-10-25

## 2024-10-25 DIAGNOSIS — Z01.812 PRE-OPERATIVE LABORATORY EXAMINATION: ICD-10-CM

## 2024-10-25 DIAGNOSIS — Z01.810 PRE-OPERATIVE CARDIOVASCULAR EXAMINATION: ICD-10-CM

## 2024-10-25 DIAGNOSIS — C67.4 MALIGNANT NEOPLASM OF POSTERIOR WALL OF URINARY BLADDER (HCC): ICD-10-CM

## 2024-10-25 DIAGNOSIS — R39.89 SUSPECTED UTI: Primary | ICD-10-CM

## 2024-10-25 NOTE — TELEPHONE ENCOUNTER
Spoke with patient and confirmed surgery date of: 11/20/24  Type of surgery:TURBT   Operating physician: Dr. Valdes  Location of surgery: [facility]     Verbally went over prep with patient on: 10/25/24  NPO  Bowel prep? No  Hospital calls afternoon prior with arrival time -Calls Friday afternoon for Monday surgeries  Patient needs ride to and from surgery (outpatient/inpatient)   Pre-op testing to be done 2 weeks prior to surgery  EKG CBC BMP U/C   Blood thinners:   PAT WILL CALL A WEEK PRIOR   Clearances needed: NONE    Mailed/emailed to patient on:  Copy of packet scanned into Media on:  Labs in packet  Soap / Bowel prep in packet  Pre-op & Post-op in packet  Dates of H&P and post-op if needed    Consent: in Media

## 2024-11-19 ENCOUNTER — PREP FOR PROCEDURE (OUTPATIENT)
Dept: UROLOGY | Facility: CLINIC | Age: 59
End: 2024-11-19

## 2024-11-19 ENCOUNTER — TELEPHONE (OUTPATIENT)
Dept: UROLOGY | Facility: CLINIC | Age: 59
End: 2024-11-19

## 2024-11-19 ENCOUNTER — ANESTHESIA EVENT (OUTPATIENT)
Dept: PERIOP | Facility: HOSPITAL | Age: 59
End: 2024-11-19
Payer: COMMERCIAL

## 2024-11-19 PROBLEM — E05.90 HYPERTHYROIDISM: Status: RESOLVED | Noted: 2023-10-11 | Resolved: 2024-11-19

## 2024-11-19 PROBLEM — K74.60 CIRRHOSIS (HCC): Status: ACTIVE | Noted: 2024-11-19

## 2024-11-19 PROBLEM — E03.9 HYPOTHYROIDISM: Status: ACTIVE | Noted: 2024-11-19

## 2024-11-19 NOTE — TELEPHONE ENCOUNTER
Called patients sister sense he is not responding to our calls or PATs calls       We have no documentation of his medical clearance being done and we need to see if he was medically cleared for surgery.      Also need to follow up regarding all pre admission testing if they were completed

## 2024-11-19 NOTE — TELEPHONE ENCOUNTER
Patient called in asking for surgical time tomorrow. Was made they will be calling him between 3 and 7pm.

## 2024-11-19 NOTE — ANESTHESIA PREPROCEDURE EVALUATION
Procedure:  TRANSURETHRAL RESECTION OF BLADDER TUMOR (TURBT) (Bladder)    Relevant Problems   CARDIO   (+) HTN (hypertension)   (+) Hyperlipidemia      ENDO   (+) Hyperthyroidism (Resolved)   (+) Hypothyroidism      GI/HEPATIC   (+) Cirrhosis (HCC)      NEURO/PSYCH   (+) Aphasia   (+) Depression      PULMONARY   (+) Moderate asthma   (+) Smoking      Other   (+) Abnormal CT scan, bladder   1/2 ppd smoker  H/o of heavy alcohol use    Cirrhosis on imaging, never followed up with GI. Preop labs ordered.     Physical Exam    Airway    Mallampati score: III  TM Distance: >3 FB  Neck ROM: full     Dental    upper dentures    Cardiovascular  Rhythm: regular, Rate: normal    Pulmonary   Breath sounds clear to auscultation    Other Findings        Anesthesia Plan  ASA Score- 3     Anesthesia Type- general with ASA Monitors.         Additional Monitors:     Airway Plan:            Plan Factors-            Patient is a current smoker.              Induction- intravenous.    Postoperative Plan- Plan for postoperative opioid use.     Perioperative Resuscitation Plan - Level 1 - Full Code.       Informed Consent- Anesthetic plan and risks discussed with patient.  I personally reviewed this patient with the CRNA. Discussed and agreed on the Anesthesia Plan with the CRNA..

## 2024-11-19 NOTE — TELEPHONE ENCOUNTER
Patient was notified after surgical packet was mailed out that he needed clearance he uses a pcp in [facility]

## 2024-11-19 NOTE — TELEPHONE ENCOUNTER
Good morning. I attempted a call to review the pt's Hx and we are unable to reach the pt as his VM is not set up. I left a VM on his sisters # and provided our triage #. He has not gone for any pre-op testing and I do not see an MC appt dates in [facility]. Do you know where/when he is going for his MC? If you have any additional contact info for him please let me know as well. TY     [facility]

## 2024-11-20 ENCOUNTER — ANESTHESIA (OUTPATIENT)
Dept: PERIOP | Facility: HOSPITAL | Age: 59
End: 2024-11-20
Payer: COMMERCIAL

## 2024-11-20 ENCOUNTER — HOSPITAL ENCOUNTER (OUTPATIENT)
Facility: HOSPITAL | Age: 59
Setting detail: OUTPATIENT SURGERY
Discharge: HOME | End: 2024-11-20
Attending: UROLOGY | Admitting: UROLOGY
Payer: COMMERCIAL

## 2024-11-20 ENCOUNTER — TELEPHONE (OUTPATIENT)
Dept: UROLOGY | Facility: CLINIC | Age: 59
End: 2024-11-20

## 2024-11-20 VITALS
OXYGEN SATURATION: 97 % | TEMPERATURE: 98.1 F | SYSTOLIC BLOOD PRESSURE: 139 MMHG | BODY MASS INDEX: 34.06 KG/M2 | HEART RATE: 61 BPM | RESPIRATION RATE: 18 BRPM | WEIGHT: 227.29 LBS | DIASTOLIC BLOOD PRESSURE: 87 MMHG

## 2024-11-20 DIAGNOSIS — C67.4 MALIGNANT NEOPLASM OF POSTERIOR WALL OF URINARY BLADDER (HCC): ICD-10-CM

## 2024-11-20 LAB
BASOPHILS # BLD AUTO: 0.05 THOUSANDS/ÂΜL (ref 0–0.1)
BASOPHILS NFR BLD AUTO: 1 % (ref 0–1)
EOSINOPHIL # BLD AUTO: 0.07 THOUSAND/ÂΜL (ref 0–0.61)
EOSINOPHIL NFR BLD AUTO: 1 % (ref 0–6)
ERYTHROCYTE [DISTWIDTH] IN BLOOD BY AUTOMATED COUNT: 14.4 % (ref 11.6–15.1)
HCT VFR BLD AUTO: 46.8 % (ref 36.5–49.3)
HGB BLD-MCNC: 15.9 G/DL (ref 12–17)
IMM GRANULOCYTES # BLD AUTO: 0.01 THOUSAND/UL (ref 0–0.2)
IMM GRANULOCYTES NFR BLD AUTO: 0 % (ref 0–2)
INR PPP: 1.29 (ref 0.85–1.19)
LYMPHOCYTES # BLD AUTO: 2.05 THOUSANDS/ÂΜL (ref 0.6–4.47)
LYMPHOCYTES NFR BLD AUTO: 41 % (ref 14–44)
MCH RBC QN AUTO: 33.6 PG (ref 26.8–34.3)
MCHC RBC AUTO-ENTMCNC: 34 G/DL (ref 31.4–37.4)
MCV RBC AUTO: 99 FL (ref 82–98)
MONOCYTES # BLD AUTO: 0.5 THOUSAND/ÂΜL (ref 0.17–1.22)
MONOCYTES NFR BLD AUTO: 10 % (ref 4–12)
NEUTROPHILS # BLD AUTO: 2.28 THOUSANDS/ÂΜL (ref 1.85–7.62)
NEUTS SEG NFR BLD AUTO: 47 % (ref 43–75)
NRBC BLD AUTO-RTO: 0 /100 WBCS
PLATELET # BLD AUTO: 84 THOUSANDS/UL (ref 149–390)
PLATELET BLD QL SMEAR: ABNORMAL
PMV BLD AUTO: 9.7 FL (ref 8.9–12.7)
PROTHROMBIN TIME: 16.6 SECONDS (ref 12.3–15)
RBC # BLD AUTO: 4.73 MILLION/UL (ref 3.88–5.62)
RBC MORPH BLD: NORMAL
WBC # BLD AUTO: 4.96 THOUSAND/UL (ref 4.31–10.16)

## 2024-11-20 PROCEDURE — NC001 PR NO CHARGE: Performed by: UROLOGY

## 2024-11-20 PROCEDURE — 85610 PROTHROMBIN TIME: CPT | Performed by: ANESTHESIOLOGY

## 2024-11-20 PROCEDURE — 88305 TISSUE EXAM BY PATHOLOGIST: CPT | Performed by: PATHOLOGY

## 2024-11-20 PROCEDURE — 85025 COMPLETE CBC W/AUTO DIFF WBC: CPT | Performed by: ANESTHESIOLOGY

## 2024-11-20 PROCEDURE — 52204 CYSTOSCOPY W/BIOPSY(S): CPT | Performed by: UROLOGY

## 2024-11-20 RX ORDER — MIDAZOLAM HYDROCHLORIDE 2 MG/2ML
INJECTION, SOLUTION INTRAMUSCULAR; INTRAVENOUS AS NEEDED
Status: DISCONTINUED | OUTPATIENT
Start: 2024-11-20 | End: 2024-11-20

## 2024-11-20 RX ORDER — SODIUM CHLORIDE, SODIUM LACTATE, POTASSIUM CHLORIDE, CALCIUM CHLORIDE 600; 310; 30; 20 MG/100ML; MG/100ML; MG/100ML; MG/100ML
125 INJECTION, SOLUTION INTRAVENOUS CONTINUOUS
Status: DISCONTINUED | OUTPATIENT
Start: 2024-11-20 | End: 2024-11-20 | Stop reason: HOSPADM

## 2024-11-20 RX ORDER — LEVOFLOXACIN 5 MG/ML
500 INJECTION, SOLUTION INTRAVENOUS ONCE
Status: COMPLETED | OUTPATIENT
Start: 2024-11-20 | End: 2024-11-20

## 2024-11-20 RX ORDER — FENTANYL CITRATE/PF 50 MCG/ML
25 SYRINGE (ML) INJECTION
Status: DISCONTINUED | OUTPATIENT
Start: 2024-11-20 | End: 2024-11-20 | Stop reason: HOSPADM

## 2024-11-20 RX ORDER — FENTANYL CITRATE 50 UG/ML
INJECTION, SOLUTION INTRAMUSCULAR; INTRAVENOUS AS NEEDED
Status: DISCONTINUED | OUTPATIENT
Start: 2024-11-20 | End: 2024-11-20

## 2024-11-20 RX ORDER — LIDOCAINE HYDROCHLORIDE 10 MG/ML
INJECTION, SOLUTION EPIDURAL; INFILTRATION; INTRACAUDAL; PERINEURAL AS NEEDED
Status: DISCONTINUED | OUTPATIENT
Start: 2024-11-20 | End: 2024-11-20

## 2024-11-20 RX ORDER — DEXAMETHASONE SODIUM PHOSPHATE 10 MG/ML
INJECTION, SOLUTION INTRAMUSCULAR; INTRAVENOUS AS NEEDED
Status: DISCONTINUED | OUTPATIENT
Start: 2024-11-20 | End: 2024-11-20

## 2024-11-20 RX ORDER — MAGNESIUM HYDROXIDE 1200 MG/15ML
LIQUID ORAL AS NEEDED
Status: DISCONTINUED | OUTPATIENT
Start: 2024-11-20 | End: 2024-11-20 | Stop reason: HOSPADM

## 2024-11-20 RX ORDER — PROPOFOL 10 MG/ML
INJECTION, EMULSION INTRAVENOUS AS NEEDED
Status: DISCONTINUED | OUTPATIENT
Start: 2024-11-20 | End: 2024-11-20

## 2024-11-20 RX ORDER — ONDANSETRON 2 MG/ML
4 INJECTION INTRAMUSCULAR; INTRAVENOUS ONCE AS NEEDED
Status: DISCONTINUED | OUTPATIENT
Start: 2024-11-20 | End: 2024-11-20 | Stop reason: HOSPADM

## 2024-11-20 RX ORDER — ONDANSETRON 2 MG/ML
INJECTION INTRAMUSCULAR; INTRAVENOUS AS NEEDED
Status: DISCONTINUED | OUTPATIENT
Start: 2024-11-20 | End: 2024-11-20

## 2024-11-20 RX ADMIN — PROPOFOL 100 MG: 10 INJECTION, EMULSION INTRAVENOUS at 13:44

## 2024-11-20 RX ADMIN — PROPOFOL 100 MG: 10 INJECTION, EMULSION INTRAVENOUS at 13:36

## 2024-11-20 RX ADMIN — LIDOCAINE HYDROCHLORIDE 50 MG: 10 INJECTION, SOLUTION EPIDURAL; INFILTRATION; INTRACAUDAL; PERINEURAL at 13:28

## 2024-11-20 RX ADMIN — FENTANYL CITRATE 50 MCG: 50 INJECTION, SOLUTION INTRAMUSCULAR; INTRAVENOUS at 13:42

## 2024-11-20 RX ADMIN — SODIUM CHLORIDE, SODIUM LACTATE, POTASSIUM CHLORIDE, AND CALCIUM CHLORIDE 125 ML/HR: .6; .31; .03; .02 INJECTION, SOLUTION INTRAVENOUS at 11:11

## 2024-11-20 RX ADMIN — MIDAZOLAM 2 MG: 1 INJECTION INTRAMUSCULAR; INTRAVENOUS at 13:23

## 2024-11-20 RX ADMIN — DEXAMETHASONE SODIUM PHOSPHATE 10 MG: 10 INJECTION, SOLUTION INTRAMUSCULAR; INTRAVENOUS at 13:37

## 2024-11-20 RX ADMIN — ONDANSETRON 4 MG: 2 INJECTION INTRAMUSCULAR; INTRAVENOUS at 13:37

## 2024-11-20 RX ADMIN — LEVOFLOXACIN: 5 INJECTION, SOLUTION INTRAVENOUS at 13:28

## 2024-11-20 RX ADMIN — PROPOFOL 200 MG: 10 INJECTION, EMULSION INTRAVENOUS at 13:28

## 2024-11-20 RX ADMIN — FENTANYL CITRATE 50 MCG: 50 INJECTION, SOLUTION INTRAMUSCULAR; INTRAVENOUS at 13:33

## 2024-11-20 NOTE — H&P
History & Physical - [facility]rology  Jaxon Lou 59 y.o. male MRN: [id number]  Unit/Bed#: OR POOL Encounter: [id number]    ASSESSMENT  Bladder cancer    PLAN:  TURBT    HISTORY OF PRESENT ILLNESS:  59 M recurrent BT.  Did not get BCG. Needs deeper biopsy.  Risk of bleeding since plt 78. Alcoholic. In remission 14 weeks.    PAST MEDICAL HISTORY:  Past Medical History:   Diagnosis Date    Anxiety     Asthma     COPD (chronic obstructive pulmonary disease) (HCC)     Depression     History of transfusion     Hyperlipidemia     Hypertension     Liver disease     Cirrhosis-new dx 2/24    Obesity        PAST SURGICAL HISTORY:  Past Surgical History:   Procedure Laterality Date    DENTAL SURGERY      FOOT SURGERY Bilateral     toe nails removed on both feet    HERNIA REPAIR      FL CYSTOURETHROSCOPY W/DEST &/RMVL TUMOR LARGE N/A 8/21/2024    Procedure: CYSTO, BLADDER BIOPSY, FULGERATION;  Surgeon: Sunny Valdes MD;  Location: [facility] MAIN OR;  Service: Urology    FL REPAIR FIRST ABDOMINAL WALL HERNIA N/A 09/27/2017    Procedure: REPAIR HERNIA VENTRAL WITH MESH;  Surgeon: Adolfo Dale MD;  Location: [facility];  Service: General    FL RPR 1ST INGUN HRNA AGE 5 YRS/> REDUCIBLE Left 10/31/2017    Procedure: INGUINAL HERNIA REPAIR WITH MESH;  Surgeon: Adolfo Dale MD;  Location: [facility];  Service: General    FL RPR 1ST INGUN HRNA AGE 5 YRS/> REDUCIBLE Right 11/07/2017    Procedure: INGUINAL HERNIA REPAIR WITH MESH;  Surgeon: Adolfo Dale MD;  Location: [facility];  Service: General    TRACHEOSTOMY      with a trauma 1989       ALLERGIES:  Allergies   Allergen Reactions    Penicillins Hives    Barium Sulfate Rash    Clindamycin Rash    Percocet [Oxycodone-Acetaminophen] Rash    Tramadol Rash    Vicodin [Hydrocodone-Acetaminophen] Rash       SOCIAL HISTORY:  Social History     Substance and Sexual Activity   Alcohol Use Not Currently    Alcohol/week: 12.0 standard drinks of alcohol    Types: 12 Standard drinks or equivalent per  week    Comment: twice weely-malt liquor, twisted teas     Social History     Substance and Sexual Activity   Drug Use Yes    Types: Marijuana    Comment: medical marijuana     Social History     Tobacco Use   Smoking Status Every Day    Current packs/day: 0.50    Average packs/day: 0.5 packs/day for 30.0 years (15.0 ttl pk-yrs)    Types: Cigarettes   Smokeless Tobacco Never   Tobacco Comments    Smokes 1/2 pack daily       FAMILY HISTORY:  Family History   Problem Relation Age of Onset    Colon cancer Family        MEDICATIONS:    Current Facility-Administered Medications:     lactated ringers infusion, 125 mL/hr, Intravenous, Continuous, Fallon Correa MD, Last Rate: 125 mL/hr at 11/20/24 1124, Continue from Pre-op at 11/20/24 1124    levofloxacin (LEVAQUIN) IVPB (premix in dextrose) 500 mg 100 mL, 500 mg, Intravenous, Once, Sunny Valdes MD    Review of Systems   Constitutional:  Negative for chills and fever.   HENT:  Negative for ear pain and sore throat.    Eyes:  Negative for pain and visual disturbance.   Respiratory:  Negative for cough and shortness of breath.    Cardiovascular:  Negative for chest pain and palpitations.   Gastrointestinal:  Negative for abdominal pain and vomiting.   Genitourinary:  Positive for hematuria. Negative for dysuria.   Musculoskeletal:  Negative for arthralgias and back pain.   Skin:  Negative for color change and rash.   Neurological:  Negative for seizures and syncope.   All other systems reviewed and are negative.      PHYSICAL EXAM:  Physical Exam  Constitutional:       Appearance: He is obese. He is not ill-appearing.   Cardiovascular:      Rate and Rhythm: Normal rate.      Pulses: Normal pulses.   Pulmonary:      Effort: Pulmonary effort is normal.      Breath sounds: Normal breath sounds.   Abdominal:      General: Abdomen is flat.      Palpations: Abdomen is soft.   Genitourinary:     Penis: Normal.    Skin:     General: Skin is warm and dry.      Capillary  "Refill: Capillary refill takes less than 2 seconds.   Neurological:      Mental Status: He is alert.   Psychiatric:         Mood and Affect: Mood normal.         Behavior: Behavior normal.         LAB RESULTS:  Lab Results   Component Value Date    WBC 4.96 11/20/2024    HGB 15.9 11/20/2024    HCT 46.8 11/20/2024    MCV 99 (H) 11/20/2024    PLT 84 (L) 11/20/2024     Lab Results   Component Value Date    SODIUM 136 08/21/2024    K 4.0 08/21/2024     08/21/2024    CO2 23 08/21/2024    BUN 11 08/21/2024    CREATININE 0.87 08/21/2024    GLUC 90 08/21/2024    CALCIUM 8.8 08/21/2024     Lab Results   Component Value Date    CALCIUM 8.8 08/21/2024     No results found for: \"PSA\"    OTHER STUDIES:  9/17/24 CT  KIDNEYS:  Normal size, contour and axis without evidence of hydronephrosis.  No mass or cyst seen.    ADRENAL GLANDS:  Normal.    AORTA:  Severe aortic calcification with ectasia.    BLADDER: Perivascular inflammatory change       Sunny Valdes MD  11/20/24    Portions of the record may have been created with voice recognition software.  Occasional wrong word or \"sound alike\" substitutions may have occurred due to the inherent limitations of voice recognition software.  Read the chart carefully and recognize, using context, where substitutions have occurred.  "

## 2024-11-20 NOTE — OP NOTE
OPERATIVE REPORT- Dr. Valdes  PATIENT NAME: Jaxon Lou    :  1965  MRN: [id number]  Pt Location: [facility] OR ROOM 04    SURGERY DATE: 2024    Surgeon: Sunny Valdes MD    Pre-op Diagnosis:  Bladder cancer recurrence    Post-op Diagnosis:  Same    Procedure:  TUR bladder tumor large    Specimen(s):   ID Type Source Tests Collected by Time Destination   1 : BLADDER BIOPSIES Tissue Urinary Bladder TISSUE EXAM Sunny Valdes MD 2024 1402        Estimated Blood Loss: Minimal    Complications: None    Drains: None    Anesthesia type: Gen LMA     Indications for surgery: Recurrent bladder tumors on cystoscopy in October    Findings: Numerous bladder tumors throughout.  These are all sessile and vascular.  Bladder is very friable.  Suspect high-grade bladder tumor.  Likely we will require BCG at minimum but more likely cystectomy.    Procedure and Technique:   After obtaining consent and identifying the patient, antibiotics were given as ordered and the patient was brought to the room.  All appropriate leads and monitors were placed and the patient was appropriately positioned on the table.  Anesthesia was administered and the patient was sterilely prepped and draped.  A timeout was performed where the patient name, , procedure, antibiotics, allergies, etc. were discussed.  All in the room were satisfied before the start of the operative procedure.  What follows are the operative findings and events.     Cystoscopy was undertaken with a continuous-flow resectoscope.  The scope was placed with a direct visualizing obturator.  Tumors were identified on the left wall and right wall and posterior wall.  Biopsy was done of the tumors on the left wall as they seem the most likely to be biopsy without perforation into the abdomen.  That would of course require a laparotomy to repair.  I was unable to really get deep bites.  This tumor was very friable.  He was shaking repeatedly during the  "procedure despite anesthesia.  He was being given very high doses of anesthetics.  He has a drinking history which made deep anesthesia difficult.  I feared that attempting to do a deep biopsy would leave him with such a perforation that it would require open surgery to repair it which is very high risk in somebody with platelets of 77,000.  I opted to just rebiopsy roughly at the surface to prove if this is CIS requiring BCG.  If he wants deeper biopsies he will likely need to be referred to a tertiary center where they can handle a laparotomy should it be necessary.  At the end of the procedure electrode fulguration was performed with a bipolar unit till hemostasis was good.  Tumor was sent as bladder wall tumor.  Did not name specifically from where.  Several samples were sent.     The procedure was terminated and the patient was awakened without incident and transferred to the PACU in satisfactory condition.    Plan:  1.  Follow-up to discuss pathology in 1 or 2 weeks.  Might require BCG induction and maintenance.    SIGNATURE: Sunny Valdes MD  DATE: November 20, 2024  TIME: 2:10 PM    Portions of the record may have been created with voice recognition software.  Occasional wrong word or \"sound alike\" substitutions may have occurred due to the inherent limitations of voice recognition software.  Read the chart carefully and recognize, using context, where substitutions have occurred.  "

## 2024-11-20 NOTE — ANESTHESIA POSTPROCEDURE EVALUATION
Post-Op Assessment Note    CV Status:  Stable  Pain Score: 0    Pain management: adequate       Mental Status:  Awake   Hydration Status:  Stable   PONV Controlled:  None   Airway Patency:  Patent     Post Op Vitals Reviewed: Yes    No anethesia notable event occurred.    Staff: Anesthesiologist           Last Filed PACU Vitals:  Vitals Value Taken Time   Temp 98.1 °F (36.7 °C) 11/20/24 1412   Pulse 62 11/20/24 1445   /87 11/20/24 1445   Resp 22 11/20/24 1445   SpO2 96 % 11/20/24 1445       Modified Karine:  Activity: 2 (11/20/2024  2:45 PM)  Respiration: 2 (11/20/2024  2:45 PM)  Circulation: 2 (11/20/2024  2:45 PM)  Consciousness: 1 (11/20/2024  2:45 PM)  Oxygen Saturation: 2 (11/20/2024  2:45 PM)  Modified Karine Score: 9 (11/20/2024  2:45 PM)

## 2024-11-20 NOTE — DISCHARGE INSTR - AVS FIRST PAGE
The tumors were resected and sent to the lab.  There was no bleeding at the end of the procedure but if you should develop significant bleeding call us on the emergency number or go to the emergency room and we will have to see you to place a catheter for irrigation purposes.  Otherwise we will see you back in the office in 1 or 2 weeks to discuss pathology results.

## 2024-11-20 NOTE — PERIOPERATIVE NURSING NOTE
Voided large amount Blood tinged urine x2, With no further clots noted. PVR = 134cc. Dr Valdes notifed and here to see patient now. Urine becoming lighter blood tinged at this time. AVS summary reviewed with patient and sister. Both verbalize understanding of all discharge instructions.

## 2024-11-20 NOTE — TELEPHONE ENCOUNTER
Secure chat from Dr Valdes:  Needs follow-up in 1 or 2 weeks to review pathology from the bladder resection today     Appt scheduled, please confirm when patient is discharged.

## 2024-11-20 NOTE — ANESTHESIA POSTPROCEDURE EVALUATION
Post-Op Assessment Note    CV Status:  Stable  Pain Score: 0    Pain management: adequate       Mental Status:  Sleepy   Hydration Status:  Stable   PONV Controlled:  None   Airway Patency:  Patent     Post Op Vitals Reviewed: Yes    No anethesia notable event occurred.    Staff: Anesthesiologist, CRNA           Last Filed PACU Vitals:  Vitals Value Taken Time   Temp     Pulse 80    /77    Resp 14    SpO2 98        Modified Karine:  No data recorded

## 2024-11-20 NOTE — PERIOPERATIVE NURSING NOTE
Received patient from PACU via stretcher awake and alert. VSS. Moderate amount of bleeding dripping from meatus. Ambulated to BR without difficulty. Voided large amount blood tinged urine with 3 small clots noted. C/o burning upon urination. Family at bedside. Tolerating oral fluids well.

## 2024-11-26 PROCEDURE — 88305 TISSUE EXAM BY PATHOLOGIST: CPT | Performed by: PATHOLOGY

## 2024-12-04 ENCOUNTER — RESULTS FOLLOW-UP (OUTPATIENT)
Dept: UROLOGY | Facility: CLINIC | Age: 59
End: 2024-12-04

## 2024-12-05 NOTE — TELEPHONE ENCOUNTER
Sister of the patient was calling to cancel his appt for today. The weather is bad where they are. She will call back at a later date to reschedule

## 2024-12-06 NOTE — TELEPHONE ENCOUNTER
Pt's sister called to reschedule appt from 12/6/24.  Scheduled pt for next avail with Dr. Valdes on 2/4/2025.  Pt also on wait list.  Pt's sister is questioning if pt can be given the results of TURBT over the phone, instead of waiting for appt.  Please review and advise.    Pt call back: Sister Lila  [phone]

## (undated) DEVICE — GLOVE INDICATOR PI UNDERGLOVE SZ 8.5 BLUE

## (undated) DEVICE — LABEL STERILE (RSC) (2-SENSOR CAINE 2-LIDOCAINE 2-HEPARIN)

## (undated) DEVICE — PACK GENERAL LF

## (undated) DEVICE — POUCH URO CATCHER II STERILE

## (undated) DEVICE — NEEDLE 25G X 1 1/2

## (undated) DEVICE — TIBURON LAPAROTOMY DRAPE: Brand: CONVERTORS

## (undated) DEVICE — SYRINGE 10ML LL CONTROL TOP

## (undated) DEVICE — BASIC DOUBLE BASIN 2-LF: Brand: MEDLINE INDUSTRIES, INC.

## (undated) DEVICE — CHLORAPREP HI-LITE 26ML ORANGE

## (undated) DEVICE — RADIOLOGY STERILE LABELS: Brand: CENTURION

## (undated) DEVICE — CYSTOSCOPY PACK: Brand: CONVERTORS

## (undated) DEVICE — 3M™ TEGADERM™ TRANSPARENT FILM DRESSING FRAME STYLE, 1626W, 4 IN X 4-3/4 IN (10 CM X 12 CM), 50/CT 4CT/CASE: Brand: 3M™ TEGADERM™

## (undated) DEVICE — SUT CHROMIC 0 CT 36 IN 914H

## (undated) DEVICE — POV-IOD SOLUTION 4OZ BT

## (undated) DEVICE — ANTIBACTERIAL VIOLET BRAIDED (POLYGLACTIN 910), SYNTHETIC ABSORBABLE SUTURE: Brand: COATED VICRYL

## (undated) DEVICE — GROUNDING PAD UNIVERSAL SLW

## (undated) DEVICE — CYSTO TUBING TUR Y IRRIGATION

## (undated) DEVICE — SCD SEQUENTIAL COMPRESSION COMFORT SLEEVE MEDIUM KNEE LENGTH: Brand: KENDALL SCD

## (undated) DEVICE — SUT PDS II 4-0 PS-2 R/C 18 IN Z513G

## (undated) DEVICE — SUT PROLENE 0 CT 30 IN 8434H

## (undated) DEVICE — STRL PENROSE DRAIN 18" X 1/2": Brand: CARDINAL HEALTH

## (undated) DEVICE — 3M™ STERI-STRIP™ REINFORCED ADHESIVE SKIN CLOSURES, R1541, 1/4 IN X 3 IN (6 MM X 75 MM), 3 STRIPS/ENVELOPE: Brand: 3M™ STERI-STRIP™

## (undated) DEVICE — 24FR BIPLR COAG ELECTRDE, STERILE: Brand: N.A.

## (undated) DEVICE — SUT PLAIN 3-0 CT-1 27 IN 842H

## (undated) DEVICE — SUT CHROMIC 0 18 IN SG14T

## (undated) DEVICE — 3M™ STERI-STRIP™ REINFORCED ADHESIVE SKIN CLOSURES, R1546, 1/4 IN X 4 IN (6 MM X 100 MM), 10 STRIPS/ENVELOPE: Brand: 3M™ STERI-STRIP™

## (undated) DEVICE — SUT CHROMIC 2-0 CT-1 27 IN 811H

## (undated) DEVICE — LUBRICANT JELLY SURGILUBE TUBE 4OZ FLIP TOP

## (undated) DEVICE — ASTOUND IMPERVIOUS SURGICAL GOWN: Brand: CONVERTORS

## (undated) DEVICE — SPONGE: SPECIALTY K-DISS XR  100/CS: Brand: MEDICAL ACTION INDUSTRIES

## (undated) DEVICE — SPONGE GAUZE 4 X 8 12 PLY STRL LF

## (undated) DEVICE — STRAP FOLEY CATH LEG 1545 9

## (undated) DEVICE — 3M™ STERI-STRIP™ REINFORCED ADHESIVE SKIN CLOSURES, R1542, 1/4 IN X 1-1/2 IN (6 MM X 38 MM), 6 STRIPS/ENVELOPE: Brand: 3M™ STERI-STRIP™

## (undated) DEVICE — GAUZE SPONGES,16 PLY: Brand: CURITY

## (undated) DEVICE — SUT PLAIN 3-0 PS-1 27 IN 1640H

## (undated) DEVICE — SUT CHROMIC 2-0 18 IN SG13T

## (undated) DEVICE — GLOVE SRG BIOGEL 8

## (undated) DEVICE — BINDER ABDOMINAL 46-62 IN

## (undated) DEVICE — GLOVE SRG BIOGEL 7.5

## (undated) DEVICE — SUT PDS II 2-0 CT-1 27 IN Z339H

## (undated) DEVICE — STANDARD SURGICAL GOWN, L: Brand: CONVERTORS

## (undated) DEVICE — SUT PROLENE 0 CT-2 30 IN 8412H